# Patient Record
Sex: MALE | Race: WHITE | NOT HISPANIC OR LATINO | Employment: FULL TIME | URBAN - METROPOLITAN AREA
[De-identification: names, ages, dates, MRNs, and addresses within clinical notes are randomized per-mention and may not be internally consistent; named-entity substitution may affect disease eponyms.]

---

## 2019-03-27 ENCOUNTER — OFFICE VISIT - HEALTHEAST (OUTPATIENT)
Dept: FAMILY MEDICINE | Facility: CLINIC | Age: 39
End: 2019-03-27

## 2019-03-27 ENCOUNTER — RECORDS - HEALTHEAST (OUTPATIENT)
Dept: ADMINISTRATIVE | Facility: OTHER | Age: 39
End: 2019-03-27

## 2019-03-27 DIAGNOSIS — R19.8 GI PROBLEM: ICD-10-CM

## 2019-03-27 DIAGNOSIS — J30.1 HAYFEVER: ICD-10-CM

## 2019-03-27 DIAGNOSIS — K58.0 IRRITABLE BOWEL SYNDROME WITH DIARRHEA: ICD-10-CM

## 2019-03-27 DIAGNOSIS — F90.1 ATTENTION DEFICIT HYPERACTIVITY DISORDER (ADHD), PREDOMINANTLY HYPERACTIVE TYPE: ICD-10-CM

## 2019-03-27 DIAGNOSIS — F43.10 PTSD (POST-TRAUMATIC STRESS DISORDER): ICD-10-CM

## 2019-03-27 DIAGNOSIS — Z79.899 MEDICAL MARIJUANA USE: ICD-10-CM

## 2019-05-09 ENCOUNTER — COMMUNICATION - HEALTHEAST (OUTPATIENT)
Dept: SCHEDULING | Facility: CLINIC | Age: 39
End: 2019-05-09

## 2019-05-14 ENCOUNTER — OFFICE VISIT - HEALTHEAST (OUTPATIENT)
Dept: FAMILY MEDICINE | Facility: CLINIC | Age: 39
End: 2019-05-14

## 2019-05-14 DIAGNOSIS — F43.10 PTSD (POST-TRAUMATIC STRESS DISORDER): ICD-10-CM

## 2019-05-14 DIAGNOSIS — K58.0 IRRITABLE BOWEL SYNDROME WITH DIARRHEA: ICD-10-CM

## 2019-06-03 ENCOUNTER — COMMUNICATION - HEALTHEAST (OUTPATIENT)
Dept: FAMILY MEDICINE | Facility: CLINIC | Age: 39
End: 2019-06-03

## 2019-08-13 ENCOUNTER — COMMUNICATION - HEALTHEAST (OUTPATIENT)
Dept: FAMILY MEDICINE | Facility: CLINIC | Age: 39
End: 2019-08-13

## 2019-08-13 DIAGNOSIS — F90.1 ATTENTION DEFICIT HYPERACTIVITY DISORDER (ADHD), PREDOMINANTLY HYPERACTIVE TYPE: ICD-10-CM

## 2019-09-17 ENCOUNTER — COMMUNICATION - HEALTHEAST (OUTPATIENT)
Dept: FAMILY MEDICINE | Facility: CLINIC | Age: 39
End: 2019-09-17

## 2019-09-26 ENCOUNTER — COMMUNICATION - HEALTHEAST (OUTPATIENT)
Dept: FAMILY MEDICINE | Facility: CLINIC | Age: 39
End: 2019-09-26

## 2019-09-26 DIAGNOSIS — F90.1 ATTENTION DEFICIT HYPERACTIVITY DISORDER (ADHD), PREDOMINANTLY HYPERACTIVE TYPE: ICD-10-CM

## 2019-11-07 ENCOUNTER — COMMUNICATION - HEALTHEAST (OUTPATIENT)
Dept: FAMILY MEDICINE | Facility: CLINIC | Age: 39
End: 2019-11-07

## 2019-11-12 ENCOUNTER — COMMUNICATION - HEALTHEAST (OUTPATIENT)
Dept: FAMILY MEDICINE | Facility: CLINIC | Age: 39
End: 2019-11-12

## 2019-11-14 ENCOUNTER — COMMUNICATION - HEALTHEAST (OUTPATIENT)
Dept: FAMILY MEDICINE | Facility: CLINIC | Age: 39
End: 2019-11-14

## 2019-12-27 ENCOUNTER — RECORDS - HEALTHEAST (OUTPATIENT)
Dept: ADMINISTRATIVE | Facility: OTHER | Age: 39
End: 2019-12-27

## 2020-01-16 ENCOUNTER — OFFICE VISIT - HEALTHEAST (OUTPATIENT)
Dept: FAMILY MEDICINE | Facility: CLINIC | Age: 40
End: 2020-01-16

## 2020-01-16 DIAGNOSIS — F90.1 ATTENTION DEFICIT HYPERACTIVITY DISORDER (ADHD), PREDOMINANTLY HYPERACTIVE TYPE: ICD-10-CM

## 2020-01-16 DIAGNOSIS — Z51.81 ENCOUNTER FOR THERAPEUTIC DRUG LEVEL MONITORING: ICD-10-CM

## 2020-01-16 DIAGNOSIS — M51.26 LUMBAR DISC HERNIATION: ICD-10-CM

## 2020-01-16 DIAGNOSIS — M47.812 SPONDYLOSIS OF CERVICAL REGION WITHOUT MYELOPATHY OR RADICULOPATHY: ICD-10-CM

## 2020-01-18 LAB
6MAM UR QL: NOT DETECTED
7AMINOCLONAZEPAM UR QL: NOT DETECTED
A-OH ALPRAZ UR QL: NOT DETECTED
ALPRAZ UR QL: NOT DETECTED
AMPHET UR QL SCN: NOT DETECTED
BARBITURATES UR QL: NOT DETECTED
BUPRENORPHINE UR QL: NOT DETECTED
BZE UR QL: NOT DETECTED
CARBOXYTHC UR QL: PRESENT
CARISOPRODOL UR QL: NOT DETECTED
CLONAZEPAM UR QL: NOT DETECTED
CODEINE UR QL: NOT DETECTED
CREAT UR-MCNC: 121.1 MG/DL (ref 20–400)
DIAZEPAM UR QL: NOT DETECTED
ETHYL GLUCURONIDE UR QL: NOT DETECTED
FENTANYL UR QL: NOT DETECTED
HYDROCODONE UR QL: NOT DETECTED
HYDROMORPHONE UR QL: NOT DETECTED
LORAZEPAM UR QL: NOT DETECTED
MDA UR QL: NOT DETECTED
MDEA UR QL: NOT DETECTED
MDMA UR QL: NOT DETECTED
ME-PHENIDATE UR QL: NOT DETECTED
MEPERIDINE UR QL: NOT DETECTED
METHADONE UR QL: NOT DETECTED
METHAMPHET UR QL: NOT DETECTED
MIDAZOLAM UR QL SCN: NOT DETECTED
MORPHINE UR QL: NOT DETECTED
NORBUPRENORPHINE UR QL CFM: NOT DETECTED
NORDIAZEPAM UR QL: NOT DETECTED
NORFENTANYL UR QL: NOT DETECTED
NORHYDROCODONE UR QL CFM: NOT DETECTED
NOROXYCODONE UR QL CFM: NOT DETECTED
NOROXYMORPHONE UR QL SCN: NOT DETECTED
OXAZEPAM UR QL: NOT DETECTED
OXYCODONE UR QL: NOT DETECTED
OXYMORPHONE UR QL: NOT DETECTED
PATHOLOGY STUDY: NORMAL
PCP UR QL: NOT DETECTED
PHENTERMINE UR QL: NOT DETECTED
PROPOXYPH UR QL: NOT DETECTED
SERVICE CMNT-IMP: NORMAL
TAPENTADOL UR QL SCN: NOT DETECTED
TAPENTADOL UR QL SCN: NOT DETECTED
TEMAZEPAM UR QL: NOT DETECTED
TRAMADOL UR QL: NOT DETECTED
ZOLPIDEM UR QL: NOT DETECTED

## 2020-01-20 ASSESSMENT — ANXIETY QUESTIONNAIRES
7. FEELING AFRAID AS IF SOMETHING AWFUL MIGHT HAPPEN: MORE THAN HALF THE DAYS
5. BEING SO RESTLESS THAT IT IS HARD TO SIT STILL: NEARLY EVERY DAY
6. BECOMING EASILY ANNOYED OR IRRITABLE: MORE THAN HALF THE DAYS
3. WORRYING TOO MUCH ABOUT DIFFERENT THINGS: NEARLY EVERY DAY
1. FEELING NERVOUS, ANXIOUS, OR ON EDGE: NEARLY EVERY DAY
GAD7 TOTAL SCORE: 18
2. NOT BEING ABLE TO STOP OR CONTROL WORRYING: MORE THAN HALF THE DAYS
4. TROUBLE RELAXING: NEARLY EVERY DAY

## 2020-01-20 ASSESSMENT — PATIENT HEALTH QUESTIONNAIRE - PHQ9: SUM OF ALL RESPONSES TO PHQ QUESTIONS 1-9: 22

## 2020-03-12 ENCOUNTER — COMMUNICATION - HEALTHEAST (OUTPATIENT)
Dept: FAMILY MEDICINE | Facility: CLINIC | Age: 40
End: 2020-03-12

## 2020-03-12 DIAGNOSIS — F90.1 ATTENTION DEFICIT HYPERACTIVITY DISORDER (ADHD), PREDOMINANTLY HYPERACTIVE TYPE: ICD-10-CM

## 2020-05-14 ENCOUNTER — COMMUNICATION - HEALTHEAST (OUTPATIENT)
Dept: FAMILY MEDICINE | Facility: CLINIC | Age: 40
End: 2020-05-14

## 2020-05-14 DIAGNOSIS — F90.1 ATTENTION DEFICIT HYPERACTIVITY DISORDER (ADHD), PREDOMINANTLY HYPERACTIVE TYPE: ICD-10-CM

## 2020-05-18 ENCOUNTER — COMMUNICATION - HEALTHEAST (OUTPATIENT)
Dept: FAMILY MEDICINE | Facility: CLINIC | Age: 40
End: 2020-05-18

## 2020-05-18 DIAGNOSIS — F90.1 ATTENTION DEFICIT HYPERACTIVITY DISORDER (ADHD), PREDOMINANTLY HYPERACTIVE TYPE: ICD-10-CM

## 2020-05-20 ENCOUNTER — COMMUNICATION - HEALTHEAST (OUTPATIENT)
Dept: FAMILY MEDICINE | Facility: CLINIC | Age: 40
End: 2020-05-20

## 2020-05-20 DIAGNOSIS — F90.1 ATTENTION DEFICIT HYPERACTIVITY DISORDER (ADHD), PREDOMINANTLY HYPERACTIVE TYPE: ICD-10-CM

## 2020-06-04 ENCOUNTER — COMMUNICATION - HEALTHEAST (OUTPATIENT)
Dept: FAMILY MEDICINE | Facility: CLINIC | Age: 40
End: 2020-06-04

## 2020-06-05 ENCOUNTER — OFFICE VISIT - HEALTHEAST (OUTPATIENT)
Dept: FAMILY MEDICINE | Facility: CLINIC | Age: 40
End: 2020-06-05

## 2020-06-05 ENCOUNTER — COMMUNICATION - HEALTHEAST (OUTPATIENT)
Dept: SCHEDULING | Facility: CLINIC | Age: 40
End: 2020-06-05

## 2020-06-05 DIAGNOSIS — F43.10 PTSD (POST-TRAUMATIC STRESS DISORDER): ICD-10-CM
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
## 2020-07-06 ENCOUNTER — COMMUNICATION - HEALTHEAST (OUTPATIENT)
Dept: FAMILY MEDICINE | Facility: CLINIC | Age: 40
End: 2020-07-06

## 2020-07-06 DIAGNOSIS — F90.1 ATTENTION DEFICIT HYPERACTIVITY DISORDER (ADHD), PREDOMINANTLY HYPERACTIVE TYPE: ICD-10-CM

## 2020-08-19 ENCOUNTER — COMMUNICATION - HEALTHEAST (OUTPATIENT)
Dept: FAMILY MEDICINE | Facility: CLINIC | Age: 40
End: 2020-08-19

## 2020-08-19 DIAGNOSIS — F90.1 ATTENTION DEFICIT HYPERACTIVITY DISORDER (ADHD), PREDOMINANTLY HYPERACTIVE TYPE: ICD-10-CM

## 2020-09-03 ENCOUNTER — COMMUNICATION - HEALTHEAST (OUTPATIENT)
Dept: FAMILY MEDICINE | Facility: CLINIC | Age: 40
End: 2020-09-03

## 2020-09-04 ENCOUNTER — COMMUNICATION - HEALTHEAST (OUTPATIENT)
Dept: FAMILY MEDICINE | Facility: CLINIC | Age: 40
End: 2020-09-04

## 2020-09-23 ENCOUNTER — COMMUNICATION - HEALTHEAST (OUTPATIENT)
Dept: SCHEDULING | Facility: CLINIC | Age: 40
End: 2020-09-23

## 2020-09-23 DIAGNOSIS — F90.1 ATTENTION DEFICIT HYPERACTIVITY DISORDER (ADHD), PREDOMINANTLY HYPERACTIVE TYPE: ICD-10-CM

## 2020-10-02 ENCOUNTER — OFFICE VISIT - HEALTHEAST (OUTPATIENT)
Dept: FAMILY MEDICINE | Facility: CLINIC | Age: 40
End: 2020-10-02

## 2020-10-02 ENCOUNTER — COMMUNICATION - HEALTHEAST (OUTPATIENT)
Dept: FAMILY MEDICINE | Facility: CLINIC | Age: 40
End: 2020-10-02

## 2020-10-02 DIAGNOSIS — F43.10 PTSD (POST-TRAUMATIC STRESS DISORDER): ICD-10-CM

## 2020-10-02 DIAGNOSIS — Z79.899 MEDICAL MARIJUANA USE: ICD-10-CM

## 2020-10-02 ASSESSMENT — PATIENT HEALTH QUESTIONNAIRE - PHQ9: SUM OF ALL RESPONSES TO PHQ QUESTIONS 1-9: 8
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
## 2020-10-14 ENCOUNTER — COMMUNICATION - HEALTHEAST (OUTPATIENT)
Dept: FAMILY MEDICINE | Facility: CLINIC | Age: 40
End: 2020-10-14

## 2020-10-27 ENCOUNTER — COMMUNICATION - HEALTHEAST (OUTPATIENT)
Dept: SCHEDULING | Facility: CLINIC | Age: 40
End: 2020-10-27

## 2020-12-21 ENCOUNTER — COMMUNICATION - HEALTHEAST (OUTPATIENT)
Dept: FAMILY MEDICINE | Facility: CLINIC | Age: 40
End: 2020-12-21

## 2020-12-21 DIAGNOSIS — F90.1 ATTENTION DEFICIT HYPERACTIVITY DISORDER (ADHD), PREDOMINANTLY HYPERACTIVE TYPE: ICD-10-CM

## 2021-02-05 ENCOUNTER — COMMUNICATION - HEALTHEAST (OUTPATIENT)
Dept: FAMILY MEDICINE | Facility: CLINIC | Age: 41
End: 2021-02-05

## 2021-02-05 DIAGNOSIS — F90.1 ATTENTION DEFICIT HYPERACTIVITY DISORDER (ADHD), PREDOMINANTLY HYPERACTIVE TYPE: ICD-10-CM

## 2021-02-08 ENCOUNTER — OFFICE VISIT - HEALTHEAST (OUTPATIENT)
Dept: FAMILY MEDICINE | Facility: CLINIC | Age: 41
End: 2021-02-08

## 2021-02-08 DIAGNOSIS — F43.10 PTSD (POST-TRAUMATIC STRESS DISORDER): ICD-10-CM

## 2021-02-08 DIAGNOSIS — Z79.899 OTHER LONG TERM (CURRENT) DRUG THERAPY: ICD-10-CM

## 2021-02-08 DIAGNOSIS — F32.0 MILD MAJOR DEPRESSION (H): ICD-10-CM

## 2021-02-08 DIAGNOSIS — F90.1 ATTENTION DEFICIT HYPERACTIVITY DISORDER (ADHD), PREDOMINANTLY HYPERACTIVE TYPE: ICD-10-CM

## 2021-03-09 ENCOUNTER — COMMUNICATION - HEALTHEAST (OUTPATIENT)
Dept: FAMILY MEDICINE | Facility: CLINIC | Age: 41
End: 2021-03-09

## 2021-03-09 DIAGNOSIS — F90.1 ATTENTION DEFICIT HYPERACTIVITY DISORDER (ADHD), PREDOMINANTLY HYPERACTIVE TYPE: ICD-10-CM

## 2021-04-06 ENCOUNTER — COMMUNICATION - HEALTHEAST (OUTPATIENT)
Dept: FAMILY MEDICINE | Facility: CLINIC | Age: 41
End: 2021-04-06

## 2021-04-07 ENCOUNTER — COMMUNICATION - HEALTHEAST (OUTPATIENT)
Dept: FAMILY MEDICINE | Facility: CLINIC | Age: 41
End: 2021-04-07

## 2021-04-07 DIAGNOSIS — F90.1 ATTENTION DEFICIT HYPERACTIVITY DISORDER (ADHD), PREDOMINANTLY HYPERACTIVE TYPE: ICD-10-CM

## 2021-04-08 ENCOUNTER — COMMUNICATION - HEALTHEAST (OUTPATIENT)
Dept: FAMILY MEDICINE | Facility: CLINIC | Age: 41
End: 2021-04-08

## 2021-04-08 DIAGNOSIS — F90.1 ATTENTION DEFICIT HYPERACTIVITY DISORDER (ADHD), PREDOMINANTLY HYPERACTIVE TYPE: ICD-10-CM

## 2021-05-11 ENCOUNTER — COMMUNICATION - HEALTHEAST (OUTPATIENT)
Dept: FAMILY MEDICINE | Facility: CLINIC | Age: 41
End: 2021-05-11

## 2021-05-11 DIAGNOSIS — F90.1 ATTENTION DEFICIT HYPERACTIVITY DISORDER (ADHD), PREDOMINANTLY HYPERACTIVE TYPE: ICD-10-CM

## 2021-05-17 RX ORDER — DEXTROAMPHETAMINE SACCHARATE, AMPHETAMINE ASPARTATE, DEXTROAMPHETAMINE SULFATE AND AMPHETAMINE SULFATE 2.5; 2.5; 2.5; 2.5 MG/1; MG/1; MG/1; MG/1
10 TABLET ORAL DAILY
Qty: 30 TABLET | Refills: 0 | Status: SHIPPED | OUTPATIENT
Start: 2021-06-09 | End: 2021-12-07

## 2021-05-17 RX ORDER — DEXTROAMPHETAMINE SACCHARATE, AMPHETAMINE ASPARTATE MONOHYDRATE, DEXTROAMPHETAMINE SULFATE AND AMPHETAMINE SULFATE 5; 5; 5; 5 MG/1; MG/1; MG/1; MG/1
20 CAPSULE, EXTENDED RELEASE ORAL DAILY
Qty: 31 CAPSULE | Refills: 0 | Status: SHIPPED | OUTPATIENT
Start: 2021-05-17 | End: 2021-12-07

## 2021-05-17 RX ORDER — DEXTROAMPHETAMINE SACCHARATE, AMPHETAMINE ASPARTATE MONOHYDRATE, DEXTROAMPHETAMINE SULFATE AND AMPHETAMINE SULFATE 5; 5; 5; 5 MG/1; MG/1; MG/1; MG/1
20 CAPSULE, EXTENDED RELEASE ORAL DAILY
Qty: 30 CAPSULE | Refills: 0 | Status: SHIPPED | OUTPATIENT
Start: 2021-06-17 | End: 2021-12-07

## 2021-05-26 ASSESSMENT — PATIENT HEALTH QUESTIONNAIRE - PHQ9: SUM OF ALL RESPONSES TO PHQ QUESTIONS 1-9: 22

## 2021-05-27 ASSESSMENT — PATIENT HEALTH QUESTIONNAIRE - PHQ9: SUM OF ALL RESPONSES TO PHQ QUESTIONS 1-9: 8

## 2021-05-27 NOTE — PATIENT INSTRUCTIONS - HE
Make an appointment to see Dr esposito for Medical Marijuana certification  Make an appointment to see A psychistrist

## 2021-05-27 NOTE — PROGRESS NOTES
OFFICE VISIT - FAMILY MEDICINE     ASSESSMENT AND PLAN     1. Attention deficit hyperactivity disorder (ADHD), predominantly hyperactive type  dextroamphetamine-amphetamine (ADDERALL) 10 mg Tab tablet    dextroamphetamine-amphetamine (ADDERALL XR) 20 MG 24 hr capsule   2. PTSD (post-traumatic stress disorder)  Ambulatory referral to Psychiatry   3. Medical marijuana use  Ambulatory referral to Psychiatry   4. Irritable bowel syndrome with diarrhea     5. Hayfever     6. GI problem     ADHD, previously managed by this clinic but has not been seen for about 4-year because he moved to [place], he is back in the [place] area, and would like his medication to be refilled, I did look at the [place] prescription drug monitoring,he is pretty consistent with refill, we did review possible side effect of medications especially when combined with marijuana,  Urged him to sign for medical marijuana program, he can make an appointment with Dr. Salazar to help with certification based on his PTSD.  Also told him to establish care with a psychiatrist.  He will follow-up in a 3-month timeframe, if there is any new concerns.  Irritable bowel syndrome has improved with avoidance of triggering foods.    CHIEF COMPLAINT   Establish Care and Medication Refill (Adderall)    HPI   Luis Enrique Rosario is a 39 y.o. male.  No Patient Care Coordination Note on file.  Last office visit at this clinic was in 2015, I have seen him October 10, 2014, at that time was suffering with irritable bowel syndrome, patient is returning as a new patient stating that he did move with a girlfriend to leaving [place], but he did not works well for him.  He is backing in the [facility].  He is still on Adderall X are 20 mg in the morning, and regular Adderall 10 mg in the afternoon, he stated that he does control his ADHD symptoms, is able to focus, able to concentrate, less distracted..  He also has a  dog to help with his PTSD, he also  has a [place] marijuana card and according to him in [place] he can get a marijuana certificate for medical or recreational.  IBS symptoms has been controlled probiotic, avoidance of triggering food like like dairy, gluten etc.  He denies any other drugs use.    Review of Systems As per HPI, otherwise negative.    OBJECTIVE   /75 (Patient Site: Right Arm, Patient Position: Sitting, Cuff Size: Adult Regular)   Pulse 66   Wt 162 lb 8 oz (73.7 kg)   SpO2 98%   BMI 23.52 kg/m    Physical Exam   Constitutional: He is oriented to person, place, and time. He appears well-developed and well-nourished.   HENT:   Head: Normocephalic and atraumatic.   Neck: Normal range of motion. Neck supple. No JVD present. No tracheal deviation present. No thyromegaly present.   Cardiovascular: Normal rate, regular rhythm, normal heart sounds and intact distal pulses. Exam reveals no gallop and no friction rub.   No murmur heard.  Pulmonary/Chest: Effort normal and breath sounds normal. No respiratory distress. He has no wheezes. He has no rales.   Musculoskeletal: He exhibits no edema or tenderness.   Lymphadenopathy:     He has no cervical adenopathy.   Neurological: He is alert and oriented to person, place, and time. Coordination normal.   Psychiatric: He has a normal mood and affect. Judgment and thought content normal.       PFSH     Family History   Problem Relation Age of Onset     Depression Mother      Depression Brother      Arthritis Maternal Grandmother      Diabetes Maternal Grandmother      Heart disease Maternal Grandmother      Hypertension Maternal Grandmother      Vision loss Maternal Grandmother      Alcohol abuse Maternal Grandfather      Heart disease Maternal Grandfather      Hypertension Maternal Grandfather      Alcohol abuse Paternal Grandmother      Arthritis Paternal Grandmother      Alcohol abuse Paternal Grandfather      Social History     Socioeconomic History     Marital status: Single     Spouse  name: Not on file     Number of children: Not on file     Years of education: Not on file     Highest education level: Not on file   Occupational History     Not on file   Social Needs     Financial resource strain: Not on file     Food insecurity:     Worry: Not on file     Inability: Not on file     Transportation needs:     Medical: Not on file     Non-medical: Not on file   Tobacco Use     Smoking status: Never Smoker     Smokeless tobacco: Never Used   Substance and Sexual Activity     Alcohol use: No     Frequency: Never     Drug use: No     Sexual activity: Not on file   Lifestyle     Physical activity:     Days per week: Not on file     Minutes per session: Not on file     Stress: Not on file   Relationships     Social connections:     Talks on phone: Not on file     Gets together: Not on file     Attends Caodaism service: Not on file     Active member of club or organization: Not on file     Attends meetings of clubs or organizations: Not on file     Relationship status: Not on file     Intimate partner violence:     Fear of current or ex partner: Not on file     Emotionally abused: Not on file     Physically abused: Not on file     Forced sexual activity: Not on file   Other Topics Concern     Not on file   Social History Narrative     Not on file     Relevant history was reviewed with the patient today, unless noted in HPI, nothing is pertinent for this visit.  [facility]     Patient Active Problem List    Diagnosis Date Noted     Hayfever      GI problem      PTSD (post-traumatic stress disorder) 12/22/2016     Medical marijuana use      Overview Note:     Created by Conversion    Replacement Utility updated for latest IMO load       Sore Throat      Overview Note:     Created by Conversion         Attention Deficit Disorder Without Hyperactivity      Overview Note:     Created by Conversion    Replacement Utility updated for latest IMO load       Anxiety      Overview Note:     Created by  Conversion    Replacement Utility updated for latest IMO load       Panic Disorder Without Agoraphobia      Overview Note:     Created by Conversion         Allergies      Overview Note:     Created by Conversion         Abdominal Pain      Overview Note:     Created by Conversion    Replacement Utility updated for latest IMO load       Allergic Rhinitis      Overview Note:     Created by Conversion    Replacement Utility updated for latest IMO load       Irritable Bowel Syndrome      Overview Note:     Created by Conversion         Allergy To Nuts      Overview Note:     Created by Conversion         Lipoma Of The Adipose Tissue      Overview Note:     Created by Conversion    Replacement Utility updated for latest IMO load       Foot Pain (Soft Tissue)      Overview Note:     Created by Conversion         No past surgical history on file.    RESULTS/CONSULTS (Lab/Rad)   No results found for this or any previous visit (from the past 168 hour(s)).  No results found.  MEDICATIONS     Current Outpatient Medications on File Prior to Visit   Medication Sig Dispense Refill     LACTOBACILLUS ACIDOPHILUS (PROBIOTIC ORAL)        loratadine-pseudoephedrine (CLARITIN-D 24-HOUR)  mg per 24 hr tablet Take 1 tablet by mouth daily. As directed.       EPINEPHrine (EPIPEN 2-ION) 0.3 mg/0.3 mL (1:1,000) atIn Inject 0.3 mg as directed. As directed.       MULTIVITAMIN ORAL        No current facility-administered medications on file prior to visit.        HEALTH MAINTENANCE / SCREENING   PHQ-2 Total Score: 3 (3/27/2019  1:38 PM)  , PHQ-9 Total Score: 15 (3/27/2019  1:38 PM)  ,VÍCTOR 7 Total Score: 10 (3/27/2019  2:00 PM)    Immunization History   Administered Date(s) Administered     DTaP, historic 11/15/1982, 08/17/1992     IPV 11/15/1982, 08/17/1992     MMR 06/15/1981, 08/17/1992     Health Maintenance   Topic     TD 18+ HE      TDAP ADULT ONE TIME DOSE      INFLUENZA VACCINE RULE BASED (1)     ADVANCE DIRECTIVES DISCUSSED WITH  PATIENT      COLONOSCOPY        Nya Mata MD  Family Medicine, [facility]     This note was dictated using a voice recognition software.  Any grammatical or context distortion are unintentional and inherent to the software.

## 2021-05-28 ASSESSMENT — ANXIETY QUESTIONNAIRES: GAD7 TOTAL SCORE: 18

## 2021-05-28 NOTE — TELEPHONE ENCOUNTER
Reason contacted:  Pt  Information relayed:  Informed pt that he will need to go through the enrollemtn process.  Made an appt with Dr. Salazar on 5/14/19 at 220 pm-okay to use reserve slot per Dr. Salazar.    Additional questions:  No  Further follow-up needed:  No  Okay to leave a detailed message:  No

## 2021-05-28 NOTE — PATIENT INSTRUCTIONS - HE
THe Microbiome Solution Lindy Brower     [facility]    Consider Consult with Dr [facility]       You are certified and should have an email from the County for visit soon    Supporting barrier function:    Try to identify and eliminate foods known to cause increased intestinal permeability:  Examples include gluten, dairy/lactose, capsaicin and spicy foods, FODMAP foods    Consider testing for food allergens and avoid any foods that are known to release mast cells or make you flush    As a rule try to avoid nonsteroidal anti-inflammatory agents if possible such as ibuprofen, naproxen, regular aspirin    Avoid strenuous physical activity/exercise or pay attention to supporting your intestinal and immune health before and after such activities.  However for the most part moderate physical activity or exercise is helpful.    Avoid all processed foods with artificial colors or flavors.    Eat abundant amounts of fresh fruits and vegetables to maximize the amount and diversity of phytonutrients.    Consider the following nutrients for Supplements:    Place to Purchase  [employer], [employer], Somewhere, Vitamin Shoppe    Omega-3 fatty acids, ALA, EPA, DHA diet or supplementation  Such as fish, nuts, olive oil, Fabrizio seeds, flax seeds, hemp seeds (ensure that seeds are ground or milled for maximum benefit)    Glutamine 4- 8 g daily    Vitamin D 2000 international units minimum daily it is best to test and adjust to achieve a 50-60 level    Probiotics (mixed drain combination 20-40 billion CFU, consider high dose for long-standing intestinal barrier issues especially those associated with inflammatory bowel disease) OTC Florajen or PathAR Therbiotic Complete    Prebiotics  (these are precursors for important short-chain fatty acids-asparagus, onion, leaks, Chicori root)    Zinc 25 mg daily along with copper 1 mg daily    Iron ( especially when iron deficiency is  confirmed)    Flavonoids (Flavonoids[edit]  red, blue, purple pigments    Flavonols    Quercetin red and yellow onions, tea, wine, apples, cranberries, buckwheat, beans, lovage.    Kaempferol tea, strawberries, gooseberries, cranberries, grapefruit, apples, peas, brassicates (broccoli, kale, brussels sprouts, cabbage), chives, spinach, endive, vivek, tomatoes.    Myricetin grapes, red wine, berries, walnuts.    Fisetin strawberries, cucumbers.    Rutin citrus fruits, oranges, maritza, limes, grapefruit, berries, peaches, apples, pagoda tree fruits, asparagus, buckwheat, parsley, tomatoes, apricots, rhubarb, tea.    Isorhamnetin red turnip, goldenrod, mustard leaf, ginkgo biloba.    Flavanones    Hesperidin citrus fruits.    Naringenin citrus fruits.    Silybin milk thistle.    Eriodictyol    Flavones    Acacetin Robinia pseudoacacia, Turnera diffusa.    Apigenin chamomile, celery, parsley.    Chrysin Passiflora caerulea, Pleurotus ostreatus, Oroxylum indicum.    Diosmetin Vicia.    Tangeritin tangerine and other citrus peels.    Luteolin beets, artichokes, celery, carrots, celeriac, rutabaga, parsley, mint, chamomile, lemongrass, chrysanthemum.    Flavan-3-ols (flavanols)    Catechins white tea, green tea, black tea, grapes, wine, apple juice, cocoa, lentils, black-eyed peas.    (+)-Catechin    (+)-Gallocatechin    (?)-Epicatechin    (?)-Epigallocatechin    (?)-Epigallocatechin gallate (EGCG) green tea.    (?)-Epicatechin 3-gallate    Theaflavin black tea.    Theaflavin-3-gallate black tea.    Thearubigins    Proanthocyanidins    Flavanonols    Anthocyanidins (flavonals) and Anthocyanins red wine, many red, purple or blue fruits and vegetables.    Pelargonidin bilberry, raspberry, strawberry.    Peonidin bilberry, blueberry, cherry, cranberry, peach.    Cyanidin red apple & pear, bilberry, blackberry, blueberry, cherry, cranberry, peach, plum, hawthorn, loganberry,  cocoa.    Delphinidin bilberry, blueberry, eggplant.    Malvidin malve, bilberry, blueberry.    Petunidin  Isoflavonoids[edit]    Isoflavones (phytoestrogens) use the 3-phenylchromen-4-one skeleton (with no hydroxyl group substitution on carbon at position 2).    Daidzein (formononetin) soy, alfalfa sprouts, red clover, chickpeas, peanuts, kudzu, other legumes.    Genistein (biochanin A) soy, alfalfa sprouts, red clover, chickpeas, peanuts, other legumes.    Glycitein soy.    Isoflavanes    Isoflavandiols    Isoflavenes    Pterocarpans or Coumestans (phytoestrogens)  Coumestrol red clover, alfalfa sprouts, soy, peas, brussels sprouts.      Colostrum/lactoferrin/IgG consider using a combination [usually a concentrated colostrum product contains 15-40% IgG and some level of lactoferrin]    Berberine 500 mg twice daily

## 2021-05-28 NOTE — TELEPHONE ENCOUNTER
As we discussed with the patient last visit, in [place] he needs to go to through the certification  process, please assist him make an appointment with Dr. Salazar to go through certification.

## 2021-05-28 NOTE — TELEPHONE ENCOUNTER
"Pt reports he has been a [place] marijuana pt for the past three years. Chronic pain, \"terrible pains in stomach\" which per pt is affecting his bi polar illness and affecting his work. Pt states \"I need to be able to access medical marijuana legally\". Per pt referred to psychiatry at [facility]. [facility] left pt a voicemail telling pt he did not need to do an intake for medical marijuana and he has not been able to reach anyone there to clarify. He has not heard back from them. Pt reports he is \"completely out of his medical marijuana and that is the only thing that his helping him\". Pt requests this be dealt with urgently.     Advised pt message would be routed to PCP to advise.     Pt verbalizes understanding and agrees to plan. Ok to leave a detailed message.    Reason for Disposition    [1] Request for URGENT new prescription or refill of \"essential\" medication (i.e., likelihood of harm to patient if not taken) AND [2] triager unable to fill per unit policy    Protocols used: MEDICATION QUESTION CALL-A-AH      "
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
## 2021-05-29 NOTE — TELEPHONE ENCOUNTER
Pt was informed that we had the wrong email address entered.  This has been correct and the pt should be getting notice soon.

## 2021-05-29 NOTE — TELEPHONE ENCOUNTER
Question following Office Visit  When did you see your provider: 5/14/19  What is your question: Patient has not heard anything from the Medical Marijuana Program.  What is happening?  Please call the patient.    Okay to leave a detailed message: Yes.  Patient is not feeling today and maybe sleeping when this call is returned.

## 2021-05-31 NOTE — TELEPHONE ENCOUNTER
Dr. Mata,    Patient came in today but was down for tomorrow and he was requesting to get his medications refilled but could not make it back down tomorrow for his appointment.    HE was hoping to get a refill and I told him he would have to have an appointment on the schedule.    Thanks,  Shari

## 2021-05-31 NOTE — TELEPHONE ENCOUNTER
Pt's informed and will call back for appointment.     Informed pt that you are not in the office today and will review message when you return. He wants his adderall to be sent to [facility] in [facility]. I did called [facility] on [address] and cancel the prescription there. Please sign new pended medication order to [facility] in [place] for pt. Thanks!

## 2021-06-01 NOTE — TELEPHONE ENCOUNTER
Who is calling:  Patient  Reason for Call:  Patient stated he still has not heard anything on his MyChart or e-mail from anyone on his certification for medical cannabis. Patient stated he called about this in June and Dr. Salazar's office had provided the wrong e-mail address. Patient is afraid that now the process has to be started. Please reach out to patient on what he is supposed to do next. See the encounter on 6/3/19.  Date of last appointment with primary care: 5/14/19  Okay to leave a detailed message: Yes  [phone]

## 2021-06-01 NOTE — TELEPHONE ENCOUNTER
Who is calling:  Patient   Reason for Call:  Patient is requesting to send below prescription to his home address . Address is on file .  Date of last appointment with primary care: 5/14/19  Okay to leave a detailed message: No

## 2021-06-01 NOTE — TELEPHONE ENCOUNTER
Controlled Substance Refill Request  Medication Name:   Requested Prescriptions     Pending Prescriptions Disp Refills     dextroamphetamine-amphetamine (ADDERALL XR) 20 MG 24 hr capsule 30 capsule 0     Sig: Take 1 capsule (20 mg total) by mouth daily.     dextroamphetamine-amphetamine (ADDERALL) 10 mg Tab tablet 30 tablet 0     Sig: Take 1 tablet by mouth daily.     Date Last Fill: 8/16/19  Pharmacy: [id number]      Submit electronically to pharmacy  Controlled Substance Agreement Date Scanned:   Encounter-Level CSA Scan Date:    There are no encounter-level csa scan date.       Last office visit with prescriber/PCP: 3/27/2019 Nya Mata MD OR same dept: 5/14/2019 Hua Salazar MD OR same specialty: 5/14/2019 Hua Salazar MD  Last physical: Visit date not found Last MTM visit: Visit date not found      Please expedite as patient has 2 days left of above medications.  Please reach out to patient if he needs to  paper copies of above medications.

## 2021-06-02 VITALS — WEIGHT: 162.5 LBS | BODY MASS INDEX: 23.52 KG/M2

## 2021-06-03 VITALS — BODY MASS INDEX: 23.16 KG/M2 | WEIGHT: 160 LBS

## 2021-06-03 NOTE — TELEPHONE ENCOUNTER
Patient Returning Call  Reason for call:  email verification  Information relayed to patient:  The patient verified the email address and it is correct at [email or web address]. He checked his spam and everything but has no email from them.   Patient has additional questions:  No  If YES, what are your questions/concerns:  NA  Okay to leave a detailed message?: No

## 2021-06-03 NOTE — TELEPHONE ENCOUNTER
Who is calling:  Patient  Reason for Call:  Patient stated he still has not heard anything on his MyChart or e-mail from anyone on his certification for medical cannabis. Patient stated he called about this in June and Dr. Salazar's office had provided the wrong e-mail address. Patient is afraid that now the process has to be started. Please reach out to patient on what he is supposed to do next. See the encounter on 6/3/19. Patient reports he needs an email from Dr. Salazar stating this was done so they will talk to him.  Date of last appointment with primary care: 5/14/19  Okay to leave a detailed message: Yes  [phone]

## 2021-06-03 NOTE — TELEPHONE ENCOUNTER
Personal Information  Patient [id number]  Email [email or web address]  Prefix  First NameJonathon  Middle Initial  Last NameHamlin  Suffix  Date of [id number]  Gender  Race/Ethnicity  Identify as ?  Phone [phone]      Verify correct email  He is certified    Jhonathan

## 2021-06-04 VITALS
SYSTOLIC BLOOD PRESSURE: 119 MMHG | OXYGEN SATURATION: 97 % | DIASTOLIC BLOOD PRESSURE: 80 MMHG | WEIGHT: 181 LBS | BODY MASS INDEX: 26.19 KG/M2 | HEART RATE: 70 BPM

## 2021-06-05 NOTE — PROGRESS NOTES
OFFICE VISIT - FAMILY MEDICINE     ASSESSMENT AND PLAN     1. Spondylosis of cervical region without myelopathy or radiculopathy     2. Attention deficit hyperactivity disorder (ADHD), predominantly hyperactive type  Pain Management Drug Panel, Urine    dextroamphetamine-amphetamine (ADDERALL) 10 mg Tab tablet    dextroamphetamine-amphetamine (ADDERALL XR) 20 MG 24 hr capsule    DISCONTINUED: dextroamphetamine-amphetamine (ADDERALL XR) 20 MG 24 hr capsule    DISCONTINUED: dextroamphetamine-amphetamine (ADDERALL) 10 mg Tab tablet   3. Lumbar disc herniation     4. Encounter for therapeutic drug level monitoring   Pain Management Drug Panel, Urine   Degenerative joint disease with disc hernia cervical and lumbar spine, we did discuss pros and cons of steroid injection and surgery, we discussed that with approach, does not seem to be responding well to physical therapy on medication therapy, next I will be probably a steroid injection before looking at surgery.  Risk and benefit discussed today.  He does use medical cannabis for posttraumatic stress disorder and for chronic back pain with some mild improvement. He will follow-up with Dr. Salazar for renewal of his certification.  ADHD, appears stable, medication does help him focus, he does take care of the apartment complex, is able to concentrate and accomplish his daily tasks.  Was on cons of medication with side effect discussed.  CHIEF COMPLAINT   Follow-up (medication; would like to start taking adderall.) and Pain (cervical neck pain, has shooting pain down arms and back. Imaging done @[facility] on 12/27/19 MRI cervical spine, would like to discuss treatment options w/Dr. Mata)    NERY   Luis Enrique Rosario is a 39 y.o. male.  No Patient Care Coordination Note on file.    For the past few months, has been experiencing neck and lower back pain, was seen by his neck specialist, MRI cervical and lumbar spine did show degenerative joint disease with disc hernia, specialist  is suggesting steroid injection, he would like to get our opinion before proceeding.  Pain is described as throbbing, moderate about 6-7 out of 10 on a scale 0-10, usually exacerbated with activity, minimally relieved with rest and medical  cannabis that he is currently using.  Has been prescribed prednisone but did not seem to help and it was making him gaining weight.  ADHD, currently taking Adderall XR 20 mg daily in the morning, and 10 mg in the afternoon as needed, he does work as a caretaker for an apartment complex, medication does help him focus at work.  He would like a refill.  He does use medical cannabis for posttraumatic stress disorder and for ongoing cervical and lumbar spine with some mild improvement of his symptoms.      Review of Systems As per HPI, otherwise negative.    OBJECTIVE   /80 (Patient Site: Left Arm, Patient Position: Sitting, Cuff Size: Adult Regular)   Pulse 70   Wt 181 lb (82.1 kg) Comment: shoes on  SpO2 97%   BMI 26.19 kg/m    Physical Exam   Constitutional: He is oriented to person, place, and time. He appears well-developed and well-nourished.   HENT:   Head: Normocephalic and atraumatic.   Neck: Normal range of motion. Neck supple. No JVD present. No tracheal deviation present. No thyromegaly present.   Cardiovascular: Normal rate, regular rhythm, normal heart sounds and intact distal pulses. Exam reveals no gallop and no friction rub.   No murmur heard.  Pulmonary/Chest: Effort normal and breath sounds normal. No respiratory distress. He has no wheezes. He has no rales.   Musculoskeletal:         General: No tenderness or edema.   Lymphadenopathy:     He has no cervical adenopathy.   Neurological: He is alert and oriented to person, place, and time. Coordination normal.   Psychiatric: He has a normal mood and affect. Judgment and thought content normal.       PFSH     Family History   Problem Relation Age of Onset     Depression Mother      Depression Brother       Arthritis Maternal Grandmother      Diabetes Maternal Grandmother      Heart disease Maternal Grandmother      Hypertension Maternal Grandmother      Vision loss Maternal Grandmother      Alcohol abuse Maternal Grandfather      Heart disease Maternal Grandfather      Hypertension Maternal Grandfather      Alcohol abuse Paternal Grandmother      Arthritis Paternal Grandmother      Alcohol abuse Paternal Grandfather      Social History     Socioeconomic History     Marital status: Single     Spouse name: Not on file     Number of children: Not on file     Years of education: Not on file     Highest education level: Not on file   Occupational History     Not on file   Social Needs     Financial resource strain: Not on file     Food insecurity:     Worry: Not on file     Inability: Not on file     Transportation needs:     Medical: Not on file     Non-medical: Not on file   Tobacco Use     Smoking status: Never Smoker     Smokeless tobacco: Never Used   Substance and Sexual Activity     Alcohol use: No     Frequency: Never     Drug use: No     Sexual activity: Not on file   Lifestyle     Physical activity:     Days per week: Not on file     Minutes per session: Not on file     Stress: Not on file   Relationships     Social connections:     Talks on phone: Not on file     Gets together: Not on file     Attends Confucianism service: Not on file     Active member of club or organization: Not on file     Attends meetings of clubs or organizations: Not on file     Relationship status: Not on file     Intimate partner violence:     Fear of current or ex partner: Not on file     Emotionally abused: Not on file     Physically abused: Not on file     Forced sexual activity: Not on file   Other Topics Concern     Not on file   Social History Narrative     Not on file     Relevant history was reviewed with the patient today, unless noted in HPI, nothing is pertinent for this visit.  [facility]     Patient Active Problem List    Diagnosis  Date Noted     Spondylosis of cervical region without myelopathy or radiculopathy 01/16/2020     Lumbar disc herniation 01/16/2020     Hayfever      GI problem      PTSD (post-traumatic stress disorder) 12/22/2016     Medical marijuana use      Overview Note:     Created by Conversion    Replacement Utility updated for latest IMO load       Sore Throat      Overview Note:     Created by Conversion         Attention Deficit Disorder Without Hyperactivity      Overview Note:     Created by Conversion    Replacement Utility updated for latest IMO load       Anxiety      Overview Note:     Created by Conversion    Replacement Utility updated for latest IMO load       Panic Disorder Without Agoraphobia      Overview Note:     Created by Conversion         Allergies      Overview Note:     Created by Conversion         Abdominal Pain      Overview Note:     Created by Conversion    Replacement Utility updated for latest IMO load       Allergic Rhinitis      Overview Note:     Created by Conversion    Replacement Utility updated for latest IMO load       Irritable Bowel Syndrome      Overview Note:     Created by Conversion         Allergy To Nuts      Overview Note:     Created by Conversion         Lipoma Of The Adipose Tissue      Overview Note:     Created by Conversion    Replacement Utility updated for latest IMO load       Foot Pain (Soft Tissue)      Overview Note:     Created by Conversion         No past surgical history on file.    RESULTS/CONSULTS (Lab/Rad)   No results found for this or any previous visit (from the past 168 hour(s)).  No results found.  MEDICATIONS     Current Outpatient Medications on File Prior to Visit   Medication Sig Dispense Refill     loratadine-pseudoephedrine (CLARITIN-D 24-HOUR)  mg per 24 hr tablet Take 1 tablet by mouth daily. As directed.       medical cannabis (Patient's own supply) by Other route see administration instructions. (The purpose of this order is to document  that the patient reports taking medical cannabis. This is not a prescription, and is not used to certify that the patient has a  qualifying medical condition.)       EPINEPHrine (EPIPEN 2-ION) 0.3 mg/0.3 mL (1:1,000) atIn Inject 0.3 mg as directed. As directed.       LACTOBACILLUS ACIDOPHILUS (PROBIOTIC ORAL)        MULTIVITAMIN ORAL        [DISCONTINUED] dextroamphetamine-amphetamine (ADDERALL XR) 20 MG 24 hr capsule Take 1 capsule (20 mg total) by mouth daily. 30 capsule 0     [DISCONTINUED] dextroamphetamine-amphetamine (ADDERALL) 10 mg Tab tablet Take 1 tablet by mouth daily. 30 tablet 0     No current facility-administered medications on file prior to visit.        HEALTH MAINTENANCE / SCREENING   PHQ-2 Total Score: 3 (3/27/2019  1:38 PM)  , PHQ-9 Total Score: 15 (3/27/2019  1:38 PM)  ,VÍCTOR 7 Total Score: 10 (3/27/2019  2:00 PM)    Immunization History   Administered Date(s) Administered     DTaP, historic 11/15/1982, 08/17/1992     IPV 11/15/1982, 08/17/1992     MMR 06/15/1981, 08/17/1992     Health Maintenance   Topic     HIV SCREENING      MEDICARE ANNUAL WELLNESS VISIT      TD 18+ HE      TDAP ADULT ONE TIME DOSE      LIPID      ADVANCE CARE PLANNING      COLONOSCOPY      DEPRESSION ACTION PLAN      INFLUENZA VACCINE RULE BASED        Nya Mata MD  Family Medicine, [facility]     This note was dictated using a voice recognition software.  Any grammatical or context distortion are unintentional and inherent to the software.

## 2021-06-06 NOTE — TELEPHONE ENCOUNTER
Controlled Substance Refill Request  Medication Name:   Requested Prescriptions     Pending Prescriptions Disp Refills     dextroamphetamine-amphetamine (ADDERALL XR) 20 MG 24 hr capsule 30 capsule 0     Sig: Take 1 capsule (20 mg total) by mouth daily.     dextroamphetamine-amphetamine (ADDERALL) 10 mg Tab tablet 30 tablet 0     Sig: Take 1 tablet by mouth daily.     Date Last Fill: 1/16/19  Is patient out of medication?:  Yes  Patient notified refills processed within 3 business days:  Yes  [facility]  Submit electronically to pharmacy  Controlled Substance Agreement on file:   Encounter-Level CSA Scan Date:    There are no encounter-level csa scan date.        Last office visit:  1/16/2020

## 2021-06-08 NOTE — TELEPHONE ENCOUNTER
"Who is calling:  The patient  Reason for Call:  The patient states he needs to be \"recertified \" for the medical marijuana program. The patient was not financially able to pay the fee to [facility] a medical dispensary in [facility] which the patient is able to pay now.   Date of last appointment with primary care: 1/16/20  Okay to leave a detailed message: Yes      "

## 2021-06-08 NOTE — TELEPHONE ENCOUNTER
Left message advising patient to please call and schedule a telephone visit with Dr. Salazar.    KLP, CMA
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
## 2021-06-08 NOTE — TELEPHONE ENCOUNTER
Patient Returning Call  Reason for call:  Patient returning call to check on the status of this request.  Information relayed to patient:  Pending providers review and approval.  Patient has additional questions:  Yes  If YES, what are your questions/concerns:  Can you let Nya Mata MD know this is an urgent matter and this medication also effects my bipolar depression as well.  Okay to leave a detailed message?: Yes

## 2021-06-08 NOTE — TELEPHONE ENCOUNTER
Controlled Substance Refill Request  Medication Name:   Requested Prescriptions     Pending Prescriptions Disp Refills     dextroamphetamine-amphetamine (ADDERALL XR) 20 MG 24 hr capsule 30 capsule 0     Sig: Take 1 capsule (20 mg total) by mouth daily.     dextroamphetamine-amphetamine (ADDERALL) 10 mg Tab tablet 30 tablet 0     Sig: Take 1 tablet by mouth daily.     Date Last Fill: 3/12/2020  Is patient out of medication?:  Yes  Patient notified refills processed within 3 business days:  Yes  [facility]  Submit electronically to pharmacy  Controlled Substance Agreement on file:   Encounter-Level CSA Scan Date:    There are no encounter-level csa scan date.        Last office visit:  1/16/2020

## 2021-06-08 NOTE — TELEPHONE ENCOUNTER
Who is calling:  The patient   Reason for Call:  The patient would like the prescription refills for the Adderal 10MG's and the Adderal XR 20MG's filled at the [facility]'s in [place], [place] instead of the [facility] in [place].   Date of last appointment with primary care:   Okay to leave a detailed message: Yes

## 2021-06-08 NOTE — TELEPHONE ENCOUNTER
Left detailed message for patient advising to please call back and get something scheduled with Dr. Salazar on Monday or any day next week that works for the patient.     Please assist scheduling a video visit when patient calls back.     KIMO, CHUCKIE

## 2021-06-08 NOTE — TELEPHONE ENCOUNTER
Called [place] pharmacy and confirmed that the Rx was canceled.  Can you please resend to correct pharmacy.

## 2021-06-09 NOTE — TELEPHONE ENCOUNTER
Controlled Substance Refill Request  Medication Name:   Requested Prescriptions     Pending Prescriptions Disp Refills     dextroamphetamine-amphetamine (ADDERALL XR) 20 MG 24 hr capsule 30 capsule 0     Sig: Take 1 capsule (20 mg total) by mouth daily.     dextroamphetamine-amphetamine (ADDERALL) 10 mg Tab tablet 30 tablet 0     Sig: Take 1 tablet by mouth daily.     Date Last Fill: 5/20/20  Is patient out of medication?:  Yes  Patient notified refills processed within 3 business days:  Yes  [facility]  Submit electronically to pharmacy  Controlled Substance Agreement on file:   Encounter-Level CSA Scan Date:    There are no encounter-level csa scan date.        Last office visit:  6/5/20

## 2021-06-10 NOTE — TELEPHONE ENCOUNTER
Controlled Substance Refill Request  Medication Name:   Requested Prescriptions     Pending Prescriptions Disp Refills     dextroamphetamine-amphetamine (ADDERALL XR) 20 MG 24 hr capsule 30 capsule 0     Sig: Take 1 capsule (20 mg total) by mouth daily.     dextroamphetamine-amphetamine (ADDERALL) 10 mg Tab tablet 30 tablet 0     Sig: Take 1 tablet by mouth daily.     Date Last Fill: 7/6/2020  Is patient out of medication?:  Yes  Patient notified refills processed within 3 business days:  Yes  [facility]  Submit electronically to pharmacy  Controlled Substance Agreement on file:   Encounter-Level CSA Scan Date:    There are no encounter-level csa scan date.        Last office visit:  6/5/2020, virtual visit

## 2021-06-11 NOTE — TELEPHONE ENCOUNTER
Who is requesting the letter?  Employer/Patient  Why is the letter needed? Pt needs a letter certifying that through the state Pt can receive Adderall.  Pt needs this as part of employment verification. Please advise.  How would you like this letter returned? Mychart  Okay to leave a detailed message? No

## 2021-06-11 NOTE — TELEPHONE ENCOUNTER
"Luis Enrique reports having chronic disability and stomach issues. Currently stomach is \"sore and raw\" feeling, exhausted. 7/10 pain, both sides, just below waistline. About the same for the past 1 to 1.5 days. Calling now because he is at work and feels like he needs to leave but needs documentation for work. Per protocol FNA advised being seen within 4 hours at an ER due to the time.       He would also like a medication refill for the 20mg Adderall as he is out. FNA will route note to Dr. Mata to address.     Patient voiced understanding and will follow disposition.  Yesenia Valenzuela  [facility] Nurse Advisor             Additional Information    Negative: Shock suspected (e.g., cold/pale/clammy skin, too weak to stand, low BP, rapid pulse)    Negative: Difficult to awaken or acting confused (e.g., disoriented, slurred speech)    Negative: Passed out (i.e., lost consciousness, collapsed and was not responding)    Negative: Sounds like a life-threatening emergency to the triager    Negative: Chest pain    Negative: Pain is mainly in upper abdomen  (if needed ask: \"is it mainly above the belly button?\")    Negative: Followed an abdomen (stomach) injury    Negative: [1] SEVERE pain (e.g., excruciating) AND [2] present > 1 hour    Negative: [1] SEVERE pain AND [2] age > 60    Negative: [1] Vomiting AND [2] contains red blood or black (\"coffee ground\") material  (Exception: few red streaks in vomit that only happened once)    Negative: Blood in bowel movements  (Exception: Blood on surface of BM with constipation)    Negative: Black or tarry bowel movements  (Exception: chronic-unchanged  black-grey bowel movements AND is taking iron pills or Pepto-bismol)    Negative: [1] Unable to urinate (or only a few drops) > 4 hours AND [2] bladder feels very full (e.g., palpable bladder or strong urge to urinate)    Negative: [1] Pain in the scrotum or testicle AND [2] present > 1 hour    Negative: Patient sounds very sick or weak to " the triager    [1] MILD-MODERATE pain AND [2] constant AND [3] present > 2 hours    Protocols used: ABDOMINAL PAIN - MALE-A-AH

## 2021-06-11 NOTE — TELEPHONE ENCOUNTER
Who is calling:  Patient  Reason for Call:    Patient states that everything is the same.  He missed the registration date.  He thought he had a couple days left to register.  Date of last appointment with primary care: 6/5/2020  Okay to leave a detailed message: Yes

## 2021-06-11 NOTE — TELEPHONE ENCOUNTER
Note written, available in communication tab.  Adderall prescription was refilled.  Will Need follow-up ov before next refill.

## 2021-06-11 NOTE — TELEPHONE ENCOUNTER
Who is calling:  Patient   Reason for Call:  Patient states medical cannabis is ended yesterday . Patient is requesting to send re- certification  For medical cannabis today As soon as possible  .  Date of last appointment with primary care: 06/05/20  Okay to leave a detailed message: No

## 2021-06-12 NOTE — TELEPHONE ENCOUNTER
Left message to call back for: Patient  Information to relay to patient:  Please scheduled OV w/Dr. Mata on Monday.

## 2021-06-12 NOTE — TELEPHONE ENCOUNTER
"Past 3 weeks/has abdominal pain with diarrhea/ had 3 episodes today/ rates pain as a \"6\"/has not worked for 3 weeks/ has to taste food at work and he thinks that is what is causing his difficulty/ he has an appointment with his regular clinic for 11/03 and he will keep that. Brother will drive him  DYLON Willis    Additional Information    Negative: Passed out (i.e., fainted, collapsed and was not responding)    Negative: Shock suspected (e.g., cold/pale/clammy skin, too weak to stand, low BP, rapid pulse)    Negative: Sounds like a life-threatening emergency to the triager    Negative: Chest pain    Negative: Pain is mainly in upper abdomen (if needed ask: 'is it mainly above the belly button?')    Negative: SEVERE abdominal pain (e.g., excruciating)    Negative: Vomiting red blood or black (coffee ground) material    Negative: Bloody, black, or tarry bowel movements    Negative: Unable to urinate (or only a few drops) and bladder feels very full    Negative: Pain in scrotum persists > 1 hour    Constant abdominal pain lasting > 2 hours    Protocols used: ABDOMINAL PAIN - MALE-A-OH      "

## 2021-06-12 NOTE — TELEPHONE ENCOUNTER
Who is calling:  Patient   Reason for Call:  Returning phone call would like to be fit in on Monday around 3 or 330 and if not could be squeezed in on Tuesday   Date of last appointment with primary care: n/a   Okay to leave a detailed message: No
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
## 2021-06-12 NOTE — TELEPHONE ENCOUNTER
New Appointment Needed  What is the reason for the visit:    Update on disability for work  Provider Preference: PCP only  How soon do you need to be seen?: As soon as possible  Waitlist offered?: No  Okay to leave a detailed message:  Yes    Patient's employer is requiring him he have a note from his doctor to exclude him for particular duties.  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  Patient has IBS.  Eating the fries leads to severe diarrhea.  Employer will not excuse him from this duty until he gets note from doctor.      Patient needs an appointment as soon as possible so he can keep his job.

## 2021-06-13 NOTE — TELEPHONE ENCOUNTER
Reason for Call:  Medication or medication refill:    Do you use a [facility]?  Name of the pharmacy and phone number for the current request: [facility] [phone]  [address]    Name of the medication requested: adderall 20 mg and adderall 10 mg    Other request: none    Can we leave a detailed message on this number? Yes    Phone number patient can be reached at: Home number on file [phone] (home)    Best Time: anytime    Call taken on 12/21/2020 at 1:39 PM by Mariluz Osorio

## 2021-06-15 NOTE — TELEPHONE ENCOUNTER
Reason for Call:  Medication or medication refill:    Do you use a [facility]?  Name of the pharmacy and phone number for the current request: [facility] [address] [address]   [phone] store [id number]    Name of the medication requested: adderall 20mg DX  adderall 10 mg   Other request: no  Has been out x 2 months due to no insurance coverage    Can we leave a detailed message on this number? Yes    Phone number patient can be reached at: Home number on file [phone] (home)    Best Time: afternoon     Call taken on 3/9/2021 at 2:07 PM by Mariluz Osorio

## 2021-06-15 NOTE — TELEPHONE ENCOUNTER
Called and spoke with Luis Enrique Rosario , Message was given, Luis Enrique Rosario  understood, no further questions.

## 2021-06-15 NOTE — TELEPHONE ENCOUNTER
Patient called and request refills for     Requested Prescriptions     Pending Prescriptions Disp Refills     dextroamphetamine-amphetamine (ADDERALL) 10 mg Tab tablet 30 tablet 0     Sig: Take 1 tablet by mouth daily.     dextroamphetamine-amphetamine (ADDERALL XR) 20 MG 24 hr capsule 30 capsule 0     Sig: Take 1 capsule (20 mg total) by mouth daily.     Medication refill requested. Please authorize medication if appropriate.     Patient is completely out of his medications.

## 2021-06-15 NOTE — PROGRESS NOTES
Luis Enrique Rosario is a 40 y.o. male who is being evaluated via a billable video visit.      How would you like to obtain your AVS? MyChart.  If dropped from the video visit, the video invitation should be resent by: Text to cell phone: [phone]   Will anyone else be joining your video visit? No      Video Start Time: 09:40 am video duration about 10 minutes, phone conversation about 10 minutes.  Total of 10 minutes.  Assessment & Plan     PTSD (post-traumatic stress disorder)  Mild major depression (H)  Need to follow with psychiatrist.    Attention deficit hyperactivity disorder (ADHD), predominantly hyperactive type  Adderall XR 20 mg daily and 10 mg in the afternoon.  Seems to be tolerating well, continue to monitor consider tapering down if symptoms improved.  - Pain Management Drug Panel, Urine; Future    Other long term (current) drug therapy   - Pain Management Drug Panel, Urine; Future    Review of external notes as documented above       20 minutes spent on the date of the encounter doing chart review, patient visit and documentation            No follow-ups on file.    Nya Mata MD  [facility]     Luis Enrique Rosario is 40 y.o. and presents to clinic today for the following health issues   HPI   Dose of ADHD, currently taking Adderall xr 20 mg daily, Adderall short acting 10 mg daily, did trial of medication about a week ago, patient focusing starting getting worse difficulty concentrating and completing his paperwork, currently applying for disability, was unable to complete his Social Security paperwork.  He does have posttraumatic stress disorder, depression, currently taking medical cannabis medication does help him relax less depressed.  He is due for renewal, has seen Dr. Salazar in the past for  renewal certification.      Review of Systems  As per HPI otherwise negative      Objective       Vitals:  No vitals were obtained today due to virtual  visit.    Physical Exam  Thought process and language is adequate, no apparent distress.          Video-Visit Details    Type of service:  Video Visit    Video End Time (time video stopped): 09:10 am video visit was about 10 minutes, changed to phone conversation was about 10 minutes because of poor video function.  Originating Location (pt. Location): Home    Distant Location (provider location):  [facility]     Platform used for Video Visit: Boxever

## 2021-06-16 ENCOUNTER — COMMUNICATION - HEALTHEAST (OUTPATIENT)
Dept: SCHEDULING | Facility: CLINIC | Age: 41
End: 2021-06-16

## 2021-06-16 PROBLEM — M47.812 SPONDYLOSIS OF CERVICAL REGION WITHOUT MYELOPATHY OR RADICULOPATHY: Status: ACTIVE | Noted: 2020-01-16

## 2021-06-16 PROBLEM — M51.26 LUMBAR DISC HERNIATION: Status: ACTIVE | Noted: 2020-01-16

## 2021-06-16 NOTE — TELEPHONE ENCOUNTER
Pt called back again today asking for the rx to pl;ease be signed and faxed over to the [facility] pharmacy  He is compleltly out of meds

## 2021-06-16 NOTE — TELEPHONE ENCOUNTER
Pt called and said that his prescriptions that was sent for his Adderall was sent to the wrong pharmacy. Per pt, the correct pharmacy should be [facility] in [place]. Pt's pharmacy has been updated.     Called the [facility] pharmacy located in [place], [place] and cancelled the 2 prescriptions that was sent yesterday.    I've re set them up to go to the correct pharmacy. Please resend.   Thanks.

## 2021-06-16 NOTE — TELEPHONE ENCOUNTER
DOD: pt called back to check on the status of the refill. Dr. Mata sent to the wrong pharmacy and so I had re-set them up to go to the right pharmacy. Thanks.

## 2021-06-16 NOTE — TELEPHONE ENCOUNTER
Reason for Call:  Medication or medication refill:    Do you use a [facility]?  Name of the pharmacy and phone number for the current request: [facility] in [place], [place]. [address] [facility] [address] [phone]    Name of the medication requested: dextroamphetamine-amphetamine (ADDERALL XR) 20 MG 24 hr capsule / dextroamphetamine-amphetamine (ADDERALL) 10 mg Tab tablet    Other request: Patient called to request to refill on these medications. Please send refills to pharmacy where he is located. Patient is on his pill today, he will not have any medication starting tomorrow.    Can we leave a detailed message on this number? Yes    Phone number patient can be reached at: Home number on file [phone] (home)    Best Time: any    Call taken on 4/7/2021 at 2:45 PM by Shaina Scott

## 2021-06-17 NOTE — TELEPHONE ENCOUNTER
Reason for Call:  Medication or medication refill:    Do you use a [facility]?  Name of the pharmacy and phone number for the current request: [facility] [phone]    Name of the medication requested: adderall 20mg    adderall 10 mg  Other request: pt will run out in a day please ok asap     Can we leave a detailed message on this number? Yes    Phone number patient can be reached at:   Cell number on file:    Telephone Information:   Mobile [phone]       Best Time: anytime    Call taken on 5/11/2021 at 2:55 PM by Mariluz Osorio

## 2021-06-17 NOTE — TELEPHONE ENCOUNTER
Pt is calling to say he received the 10 mg adderall but not the 20,,, he also said this is the 3rd time this has happened

## 2021-06-19 NOTE — LETTER
Letter by Nya Mata MD at      Author: Nya Mata MD Service: -- Author Type: --    Filed:  Encounter Date: 3/27/2019 Status: (Other)         March 28, 2019     Patient: Luis Enrique Rosario   YOB: 1980   Date of Visit: 3/27/2019       To Whom it May Concern:    Luis Enrique Rosario was seen in my clinic on 3/27/2019.    If you have any questions or concerns, please don't hesitate to call.    Sincerely,         Electronically signed by Nya Mata MD

## 2021-06-19 NOTE — LETTER
Letter by Nya Mata MD at      Author: Nya Mata MD Service: -- Author Type: --    Filed:  Encounter Date: 3/27/2019 Status: (Other)         [facility] FAMILY MEDICINE/OB  03/27/19    Patient: Luis Enrique Rosario  YOB: 1980  Medical Record Number: [id number]  CSN: [id number]                                                                              Non-opioid Controlled Substance Agreement    I understand that my care provider has prescribed a controlled substance to help manage my condition(s). I am taking this medicine to help me function or work. I know this is strong medicine, and that it can cause serious side effects. Controlled substances can be sedating, addicting and may cause a dependency on the drug. They can affect my ability to drive or think, and cause depression. They need to be taken exactly as prescribed. Combining controlled substances with certain medicines or chemicals (such as cocaine, sedatives and tranquilizers, sleeping pills, meth) can be dangerous or even fatal. Also, if I stop controlled substances suddenly, I may have severe withdrawal symptoms.  If not helpful, I may be asked to stop them.    The risks, benefits, and side effects of these medicine(s) were explained to me. I agree that:    1. I will take part in other treatments as advised by my care team. This may be psychiatry or counseling, physical therapy, behavioral therapy, group treatment or a referral to a pain clinic. I will reduce or stop my medicine when my care team tells me to do so.  2. I will take my medicines as prescribed. I will not change the dose or schedule unless my care team tells me to. There will be no refills if I run out early.  I may be contactedwithout warning and asked to complete a urine drug test or pill count at any time.   3. I will keep all my appointments, and understand this is part of the monitoring of controlled substances. My care team may require  an office visit for EVERY controlled substance refill. If I miss appointments or dont follow instructions, my care team may stop my medicine.  4. I will not ask other providers to prescribe controlled substances, and I will not accept controlled substances from other people. If I need another prescribed controlled substance for a new reason, I will tell my care team within 1 business day.  5. I will use one pharmacy to fill all of my controlled substance prescriptions, and it is up to me to make sure that I do not run out of my medicines on weekends or holidays. If my care team is willing to refill my controlled substance prescription without a visit, I must request refills only during office hours, refills may take up to 3 days to process, and it may take up to 5 to 7 days for my medicine to be mailed and ready at my pharmacy. Prescriptions will not be mailed anywhere except my pharmacy.    6. I am responsible for my prescriptions. If the medicine/prescription is lost or stolen, it will not be replaced. I also agree not to share controlled substance medicines with anyone.          [facility] FAMILY MEDICINE/OB  03/27/19  Patient:  Luis Enrique Rosario  YOB: 1980  Medical Record Number: [id number]  CSN: [id number]    7. I agree to not use ANY illegal or recreational drugs. This includes marijuana, cocaine, bath salts or other drugs. I agree not to use alcohol unless my care team says I may. I agree to give urine samples whenever asked. If I dont give a urine sample, the care team may stop my medicine.    8. If I enroll in the [place] Medical Marijuana program, I will tell my care team. I will also sign an agreement to share my medical records with my care team.    9. I will bring in my list of medicines (or my medicine bottles) each time I come to the clinic.   10. I will tell my care team right away if I become pregnant or have a new medical problem treated outside of my regular clinic.  11. I  understand that this medicine can affect my thinking and judgment. It may be unsafe for me to drive, use machinery and do dangerous tasks. I will not do any of these things until I know how the medicine affects me. If my dose changes, I will wait to see how it affects me. I will contact my care team if I have concerns about medicine side effects.    I understand that if I do not follow any of the conditions above, my prescriptions or treatment may be stopped.      I agree that my provider, clinic care team, and pharmacy may work with any city, state or federal law enforcement agency that investigates the misuse, sale, or other diversion of my controlled medicine. I will allow my provider to discuss my care with or share a copy of this agreement with any other treating provider, pharmacy or emergency room where I receive care. I agree to give up (waive) any right of privacy or confidentiality with respect to these consents.   I have read this agreement and have asked questions about anything I did not understand.    ___________________________________________________________________________  Patient signature - Date/Time  -Luis Enrique Rosario                                      ___________________________________________________________________________  Witness signature                                                                    ___________________________________________________________________________  Provider signature- Nya Mata MD

## 2021-06-20 NOTE — LETTER
Letter by Hua Salazar MD at      Author: Hua Salazar MD Service: -- Author Type: --    Filed:  Encounter Date: 10/2/2020 Status: (Other)         October 2, 2020     Patient: Luis Enrique Rosario   YOB: 1980   Date of Visit: 10/2/2020       To Whom It May Concern:    It is my medical opinion that Luis Enrique Rosario may return to full duty immediately with no restrictions.     Luis Enrique is followed in our clinic by Dr. Allen Mata and myself.    His is prescribed and adherence to Adderall (Amphetamine / Dextroamphetamine) and Medical Cannabis, both are recommend for Luis Enrique for qualified medical conditions.  Future drug testing and urinalysis may detect THC and Amphetamine, which would be expected from these prescription medications.        If you have any questions or concerns, please don't hesitate to call.    Sincerely,        Electronically signed by Hua Salazar MD

## 2021-06-20 NOTE — LETTER
Letter by Nya Mata MD at      Author: Nya Mata MD Service: -- Author Type: --    Filed:  Encounter Date: 9/3/2020 Status: (Other)         September 9, 2020     Patient: Luis Enrique Rosario   YOB: 1980   Date of Visit: 9/3/2020       To Whom It May Concern:    It is my medical opinion that Luis Enrique Rosario is currently taking Adderall.    If you have any questions or concerns, please don't hesitate to call.    Sincerely,        Electronically signed by Nya Mata MD

## 2021-06-20 NOTE — LETTER
Letter by Nya Mata MD at      Author: Nya Mata MD Service: -- Author Type: --    Filed:  Encounter Date: 1/16/2020 Status: Signed                    My Depression Action Plan  Name: Luis Enrique Rosario   Date of Birth 1980  Date: 1/16/2020    My Doctor: Nya Mata MD   My Clinic: [facility] FAMILY MEDICINE/OB  [address] [address]  [phone]          GREEN    ZONE   Good Control    What it looks like:     Things are going generally well. You have normal ups and downs. You may even feel depressed from time to time, but bad moods usually last less than a day.   What you need to do:  1. Continue to care for yourself (see self care plan)  2. Check your depression survival kit and update it as needed  3. Follow your physicians recommendations including any medication.  4. Do not stop taking medication unless you consult with your physician first.           YELLOW         ZONE Getting Worse    What it looks like:     Depression is starting to interfere with your life.     It may be hard to get out of bed; you may be starting to isolate yourself from others.    Symptoms of depression are starting to last most all day and this has happened for several days.     You may have suicidal thoughts but they are not constant.   What you need to do:     1. Call your care team. Your response to treatment will improve if you keep your care team informed of your progress. Yellow periods are signs an adjustment may need to be made.     2. Continue your self-care.  Just get dressed and ready for the day.  Don't give yourself time to talk yourself out of it.    3. Talk to someone in your support network.    4. Open up your depression Depression Self-Care Plan / Wellness kit.           RED    ZONE Medical Alert - Get Help    What it looks like:     Depression is seriously interfering with your life.     You may experience these or other symptoms: You cant get out  of bed most days, cant work or engage in other necessary activities, you have trouble taking care of basic hygiene, or basic responsibilities, thoughts of suicide or death that will not go away, self-injurious behavior.     What you need to do:  1. Call your care team and request a same-day appointment. If they are not available (weekends or after hours) call your local crisis line, emergency room or 911.            Self-Care Plan / Wellness Kit    Self-Care for Depression  Heres the deal. Your body and mind are really not as separate as most people think.  What you do and think affects how you feel and how you feel influences what you do and think. This means if you do things that people who feel good do, it will help you feel better.  Sometimes this is all it takes.  There is also a place for medication and therapy depending on how severe your depression is, so be sure to consult with your medical provider and/ or Behavioral Health Consultant if your symptoms are worsening or not improving.     In order to better manage my stress, I will:    Exercise  Get some form of exercise, every day. This will help reduce pain and release endorphins, the feel good chemicals in your brain. This is almost as good as taking antidepressants!  This is not the same as joining a gym and then never going! (they count on that by the way?) It can be as simple as just going for a walk or doing some gardening, anything that will get you moving.      Hygiene   Maintain good hygiene (get out of bed in the morning, make your bed, brush your teeth, take a shower, and get dressed like you were going to work, even if you are unemployed).  If your clothes don't fit try to get ones that do.    Diet  Strive to eat foods that are good for me, drink plenty of water, and avoid excessive sugar, caffeine, alcohol, and other mood-altering substances.  Some foods that are helpful in depression are: complex carbohydrates, B vitamins, flaxseed, fish or  fish oil, fresh fruits and vegetables.    Psychotherapy  Agree to participate in Individual Therapy (if recommended).    Medication  If prescribed medications, I agree to take them.  Missing doses can result in serious side effects.  I understand that drinking alcohol, or other illicit drug use, may cause potential side effects.  I will not stop my medication abruptly without first discussing it with my provider.    Staying Connected With Others  Stay in touch with my friends, family members, and my primary care provider/team.    Use your imagination  Be creative.  We all have a creative side; it doesnt matter if its oil painting, sand castles, or mud pies! This will also kick up the endorphins.    Witness Beauty  (AKA stop and smell the roses) Take a look outside, even in mid-winter. Notice colors, textures. Watch the squirrels and birds.     Service to others  Be of service to others.  There is always someone else in need.  By helping others we can get out of ourselves and remember the really important things.  This also provides opportunities for practicing all the other parts of the program.    Humor  Laugh and be silly!  Adjust your TV habits for less news and crime-drama and more comedy.    Control your stress  Try breathing deep, massage therapy, biofeedback, and meditation. Find time to relax each day.     Crisis Text Line  http://www.crisistextline.org    The Crisis Text Line serves anyone, in any type of crisis, providing access to free, 24/7 support and information via the medium people already use and trust:    Here's how it works:  1.  Text 848-199 from anywhere in the [place], anytime, about any type of crisis.  2.  A live, trained Crisis Counselor receives the text and responds quickly.  3.  The volunteer Crisis Counselor will help you move from a 'hot moment to a cool moment'.  My support system    Clinic Contact:  Phone number:    Contact 1:  Phone number:    Contact 2:  Phone number:     [employer]/:  Phone number:    Therapist:  Phone number:    [facility] crisis center:    Phone number:    Other community support:  Phone number:

## 2021-06-20 NOTE — LETTER
Letter by Yesenia Valenzuela RN at      Author: Yesenia Valenzuela RN Service: -- Author Type: --    Filed:  Encounter Date: 9/23/2020 Status: (Other)         September 25, 2020     Patient: Luis Enrique Rosario   YOB: 1980   Date of Visit: 9/23/2020       To Whom It May Concern:    It is my medical opinion that Luis Enrique Rosario did not feel well on September 23 and had to leave, please excuse him from missing work.    If you have any questions or concerns, please don't hesitate to call.    Sincerely,        Electronically signed by MD Esperanza

## 2021-06-20 NOTE — LETTER
Letter by Nya Mata MD at      Author: Nya Mata MD Service: -- Author Type: --    Filed:  Encounter Date: 1/16/2020 Status: Signed         [facility] FAMILY MEDICINE/OB  01/16/20    Patient: Luis Enrique Rosario  YOB: 1980  Medical Record Number: [id number]  CSN: [id number]                                                                              Non-opioid Controlled Substance Agreement    I understand that my care provider has prescribed a controlled substance to help manage my condition(s). I am taking this medicine to help me function or work. I know this is strong medicine, and that it can cause serious side effects. Controlled substances can be sedating, addicting and may cause a dependency on the drug. They can affect my ability to drive or think, and cause depression. They need to be taken exactly as prescribed. Combining controlled substances with certain medicines or chemicals (such as cocaine, sedatives and tranquilizers, sleeping pills, meth) can be dangerous or even fatal. Also, if I stop controlled substances suddenly, I may have severe withdrawal symptoms.  If not helpful, I may be asked to stop them.    The risks, benefits, and side effects of these medicine(s) were explained to me. I agree that:    1. I will take part in other treatments as advised by my care team. This may be psychiatry or counseling, physical therapy, behavioral therapy, group treatment or a referral to a pain clinic. I will reduce or stop my medicine when my care team tells me to do so.  2. I will take my medicines as prescribed. I will not change the dose or schedule unless my care team tells me to. There will be no refills if I run out early.  I may be contactedwithout warning and asked to complete a urine drug test or pill count at any time.   3. I will keep all my appointments, and understand this is part of the monitoring of controlled substances. My care team may require  an office visit for EVERY controlled substance refill. If I miss appointments or dont follow instructions, my care team may stop my medicine.  4. I will not ask other providers to prescribe controlled substances, and I will not accept controlled substances from other people. If I need another prescribed controlled substance for a new reason, I will tell my care team within 1 business day.  5. I will use one pharmacy to fill all of my controlled substance prescriptions, and it is up to me to make sure that I do not run out of my medicines on weekends or holidays. If my care team is willing to refill my controlled substance prescription without a visit, I must request refills only during office hours, refills may take up to 3 days to process, and it may take up to 5 to 7 days for my medicine to be mailed and ready at my pharmacy. Prescriptions will not be mailed anywhere except my pharmacy.    6. I am responsible for my prescriptions. If the medicine/prescription is lost or stolen, it will not be replaced. I also agree not to share controlled substance medicines with anyone.          [facility] FAMILY MEDICINE/OB  01/16/20  Patient:  Luis Enrique Rosario  YOB: 1980  Medical Record Number: [id number]  CSN: [id number]    7. I agree to not use ANY illegal or recreational drugs. This includes marijuana, cocaine, bath salts or other drugs. I agree not to use alcohol unless my care team says I may. I agree to give urine samples whenever asked. If I dont give a urine sample, the care team may stop my medicine.    8. If I enroll in the [place] Medical Marijuana program, I will tell my care team. I will also sign an agreement to share my medical records with my care team.    9. I will bring in my list of medicines (or my medicine bottles) each time I come to the clinic.   10. I will tell my care team right away if I become pregnant or have a new medical problem treated outside of my regular clinic.  11. I  understand that this medicine can affect my thinking and judgment. It may be unsafe for me to drive, use machinery and do dangerous tasks. I will not do any of these things until I know how the medicine affects me. If my dose changes, I will wait to see how it affects me. I will contact my care team if I have concerns about medicine side effects.    I understand that if I do not follow any of the conditions above, my prescriptions or treatment may be stopped.      I agree that my provider, clinic care team, and pharmacy may work with any city, state or federal law enforcement agency that investigates the misuse, sale, or other diversion of my controlled medicine. I will allow my provider to discuss my care with or share a copy of this agreement with any other treating provider, pharmacy or emergency room where I receive care. I agree to give up (waive) any right of privacy or confidentiality with respect to these consents.   I have read this agreement and have asked questions about anything I did not understand.    ___________________________________________________________________________  Patient signature - Date/Time  -Luis Enrique Rosario                                      ___________________________________________________________________________  Witness signature                                                                    ___________________________________________________________________________  Provider signature- Nya Mata MD

## 2021-06-21 NOTE — LETTER
Letter by Nya Mata MD at      Author: Nya Mata MD Service: -- Author Type: --    Filed:  Encounter Date: 2/8/2021 Status: (Other)         [facility]  02/08/21    Patient: Luis Enrique Rosario  YOB: 1980  Medical Record Number: [id number]  CSN: [id number]                                                                              Non-opioid Controlled Substance Agreement    I understand that my care provider has prescribed a controlled substance to help manage my condition(s). I am taking this medicine to help me function or work. I know this is strong medicine, and that it can cause serious side effects. Controlled substances can be sedating, addicting and may cause a dependency on the drug. They can affect my ability to drive or think, and cause depression. They need to be taken exactly as prescribed. Combining controlled substances with certain medicines or chemicals (such as cocaine, sedatives and tranquilizers, sleeping pills, meth) can be dangerous or even fatal. Also, if I stop controlled substances suddenly, I may have severe withdrawal symptoms.  If not helpful, I may be asked to stop them.    The risks, benefits, and side effects of these medicine(s) were explained to me. I agree that:    1. I will take part in other treatments as advised by my care team. This may be psychiatry or counseling, physical therapy, behavioral therapy, group treatment or a referral to a pain clinic. I will reduce or stop my medicine when my care team tells me to do so.  2. I will take my medicines as prescribed. I will not change the dose or schedule unless my care team tells me to. There will be no refills if I run out early.  I may be contactedwithout warning and asked to complete a urine drug test or pill count at any time.   3. I will keep all my appointments, and understand this is part of the monitoring of controlled substances. My care team may require an  office visit for EVERY controlled substance refill. If I miss appointments or dont follow instructions, my care team may stop my medicine.  4. I will not ask other providers to prescribe controlled substances, and I will not accept controlled substances from other people. If I need another prescribed controlled substance for a new reason, I will tell my care team within 1 business day.  5. I will use one pharmacy to fill all of my controlled substance prescriptions, and it is up to me to make sure that I do not run out of my medicines on weekends or holidays. If my care team is willing to refill my controlled substance prescription without a visit, I must request refills only during office hours, refills may take up to 3 days to process, and it may take up to 5 to 7 days for my medicine to be mailed and ready at my pharmacy. Prescriptions will not be mailed anywhere except my pharmacy.    6. I am responsible for my prescriptions. If the medicine/prescription is lost or stolen, it will not be replaced. I also agree not to share controlled substance medicines with anyone.          [facility]  02/08/21  Patient:  Luis Enrique Rosario  YOB: 1980  Medical Record Number: [id number]  CSN: [id number]    7. I agree to not use ANY illegal or recreational drugs. This includes marijuana, cocaine, bath salts or other drugs. I agree not to use alcohol unless my care team says I may. I agree to give urine samples whenever asked. If I dont give a urine sample, the care team may stop my medicine.    8. If I enroll in the [place] Medical Marijuana program, I will tell my care team. I will also sign an agreement to share my medical records with my care team.    9. I will bring in my list of medicines (or my medicine bottles) each time I come to the clinic.   10. I will tell my care team right away if I become pregnant or have a new medical problem treated outside of my regular clinic.  11. I  understand that this medicine can affect my thinking and judgment. It may be unsafe for me to drive, use machinery and do dangerous tasks. I will not do any of these things until I know how the medicine affects me. If my dose changes, I will wait to see how it affects me. I will contact my care team if I have concerns about medicine side effects.    I understand that if I do not follow any of the conditions above, my prescriptions or treatment may be stopped.      I agree that my provider, clinic care team, and pharmacy may work with any city, state or federal law enforcement agency that investigates the misuse, sale, or other diversion of my controlled medicine. I will allow my provider to discuss my care with or share a copy of this agreement with any other treating provider, pharmacy or emergency room where I receive care. I agree to give up (waive) any right of privacy or confidentiality with respect to these consents.   I have read this agreement and have asked questions about anything I did not understand.    ___________________________________________________________________________  Patient signature - Date/Time  -Luis Enrique Rosario                                      ___________________________________________________________________________  Witness signature                                                                    ___________________________________________________________________________  Provider signature- Nya Mata MD

## 2021-06-23 ENCOUNTER — COMMUNICATION - HEALTHEAST (OUTPATIENT)
Dept: FAMILY MEDICINE | Facility: CLINIC | Age: 41
End: 2021-06-23

## 2021-06-23 DIAGNOSIS — F90.1 ATTENTION DEFICIT HYPERACTIVITY DISORDER (ADHD), PREDOMINANTLY HYPERACTIVE TYPE: ICD-10-CM

## 2021-06-24 RX ORDER — DEXTROAMPHETAMINE SACCHARATE, AMPHETAMINE ASPARTATE MONOHYDRATE, DEXTROAMPHETAMINE SULFATE AND AMPHETAMINE SULFATE 5; 5; 5; 5 MG/1; MG/1; MG/1; MG/1
20 CAPSULE, EXTENDED RELEASE ORAL DAILY
Qty: 31 CAPSULE | Refills: 0 | Status: SHIPPED | OUTPATIENT
Start: 2021-06-24 | End: 2021-12-07

## 2021-06-24 RX ORDER — DEXTROAMPHETAMINE SACCHARATE, AMPHETAMINE ASPARTATE, DEXTROAMPHETAMINE SULFATE AND AMPHETAMINE SULFATE 2.5; 2.5; 2.5; 2.5 MG/1; MG/1; MG/1; MG/1
10 TABLET ORAL DAILY
Qty: 30 TABLET | Refills: 0 | Status: SHIPPED | OUTPATIENT
Start: 2021-06-24 | End: 2021-12-07

## 2021-06-25 NOTE — TELEPHONE ENCOUNTER
Johathon calls in to report that he has symptoms last night for about 2 hours of blurry vision and nausea. He presented to eye care specialist and has been diagnosed with cataracts in both eyes and also having pressure issues in his eyes that he states is glaucoma.  He has 2100 vision in his left eye and some cataract in his right eye.  He is feeling better today.  Just  Wanted Dr. Mata to be aware of the situation and if provider  Thinks he should follow up with him or if he has any advice for what to watch for can call and leave detailed message at [phone].

## 2021-06-25 NOTE — TELEPHONE ENCOUNTER
Just follow the recommendation from the eye doctors, make an appointment for an annual wellness in the late summer but feel free to come in sooner if there is any new issues.

## 2021-06-26 ENCOUNTER — HEALTH MAINTENANCE LETTER (OUTPATIENT)
Age: 41
End: 2021-06-26

## 2021-06-26 NOTE — TELEPHONE ENCOUNTER
Controlled Substance Refill Request  Medication Name:   Requested Prescriptions     Pending Prescriptions Disp Refills     dextroamphetamine-amphetamine (ADDERALL XR) 20 MG 24 hr capsule 31 capsule 0     Sig: Take 1 capsule (20 mg total) by mouth daily.     dextroamphetamine-amphetamine (ADDERALL) 10 mg Tab tablet 30 tablet 0     Sig: Take 1 tablet by mouth daily.     Date Last Fill: 5/17/21  [facility]ed [facility]  Submit electronically to pharmacy  Controlled Substance Agreement on file:   Encounter-Level CSA Scan Date:    There are no encounter-level csa scan date.        Last office visit:  2/8/21  Lila Michel RN, MA  [facility]    Triage Nurse Advisor

## 2021-07-03 NOTE — ADDENDUM NOTE
Addendum Note by Nya Mata MD at 8/13/2019  5:30 PM     Author: Nya Mata MD Service: -- Author Type: Physician    Filed: 8/13/2019  5:30 PM Encounter Date: 8/13/2019 Status: Signed    : Nya Mata MD (Physician)    Addended by: NYA MATA on: 8/13/2019 05:30 PM        Modules accepted: Orders

## 2021-07-03 NOTE — ADDENDUM NOTE
Addendum Note by Nya Mata MD at 9/25/2020 10:21 AM     Author: Nya Mata MD Service: -- Author Type: Physician    Filed: 9/25/2020 10:21 AM Encounter Date: 9/23/2020 Status: Signed    : Nya Mata MD (Physician)    Addended by: NYA MATA on: 9/25/2020 10:21 AM        Modules accepted: Orders

## 2021-07-03 NOTE — ADDENDUM NOTE
Addendum Note by Yoav Berman CMA at 8/16/2019 10:18 AM     Author: Yoav Berman CMA Service: -- Author Type: Certified Medical Assistant    Filed: 8/16/2019 10:18 AM Encounter Date: 8/13/2019 Status: Signed    : Yoav Berman CMA (Certified Medical Assistant)    Addended by: YOAV BERMAN on: 8/16/2019 10:18 AM        Modules accepted: Orders

## 2021-07-12 ENCOUNTER — COMMUNICATION - HEALTHEAST (OUTPATIENT)
Dept: SCHEDULING | Facility: CLINIC | Age: 41
End: 2021-07-12

## 2021-07-12 NOTE — TELEPHONE ENCOUNTER
"Telephone Encounter by Melita Mendez RN at 7/12/2021  9:07 AM     Author: Melita Mendez, RN Service: -- Author Type: Registered Nurse    Filed: 7/12/2021  9:35 AM Encounter Date: 7/12/2021 Status: Signed    : Melita Mendez RN (Registered Nurse)       Update for Dr Mata.    In past 2 hrs has had 6 diarrhea stools. Just restarted his probiotics. He thinks the probiotics have just not \"kicked-in\" yet.  He's unable to return to work today, yet.    Luis Enrique will need a note for work. He'll inform Dr Mata of his progress later today or sometime tomorrow to have the note include all his missed days. First day out was 7/6/21.    He wants the covid serology test done in the future. He'll ask to have this ordered when he comes in, hopefully in the near future, for his annual exam.    Dr. Mata can always message Luis Enrique via [employer].    COVID 19 Nurse Triage Plan/Patient Instructions    Please be aware that novel coronavirus (COVID-19) may be circulating in the community. If you develop symptoms such as fever, cough, or SOB or if you have concerns about the presence of another infection including coronavirus (COVID-19), please contact your health care provider or visit  [email or web address].    Disposition/Instructions    Home care recommended. Follow home care protocol based instructions.    Thank you for taking steps to prevent the spread of this virus.  o Limit your contact with others.  o Wear a simple mask to cover your cough.  o Wash your hands well and often.    Resources    [facility]: About COVID-19: [email or web address]    CDC: What to Do If You're Sick: www.cdc.gov/coronavirus/2019-ncov/about/steps-when-sick.html    CDC: Ending Home Isolation: www.cdc.gov/coronavirus/2019-ncov/hcp/disposition-in-home-patients.html     CDC: Caring for Someone: www.cdc.gov/coronavirus/2019-ncov/if-you-are-sick/care-for-someone.html     [facility]: Interim Guidance for Hospital Discharge to Home: " www.health.[facility].mn.us/diseases/coronavirus/hcp/hospdischarge.pdf    [facility] clinical trials (COVID-19 research studies): clinicalaffairs.[facility]/n-clinical-trials     Below are the COVID-19 hotlines at the [facility] ([facility]). Interpreters are available.   o For health questions: Call 616-079-8809 or 1-594.154.8400 (7 a.m. to 7 p.m.)  o For questions about schools and childcare: Call 625-451-5236 or 1-478.621.3064 (7 a.m. to 7 p.m.)     Reason for Disposition  ? Nursing judgment    Protocols used: NO PROTOCOL AVAILABLE - INFORMATION ONLY-A-[place]    Melita ROTH RN FNA

## 2021-07-13 ENCOUNTER — TELEPHONE (OUTPATIENT)
Dept: FAMILY MEDICINE | Facility: CLINIC | Age: 41
End: 2021-07-13

## 2021-07-13 ENCOUNTER — TRANSFERRED RECORDS (OUTPATIENT)
Dept: HEALTH INFORMATION MANAGEMENT | Facility: CLINIC | Age: 41
End: 2021-07-13

## 2021-07-13 NOTE — TELEPHONE ENCOUNTER
Telephone Encounter by Nya Mata MD at 7/13/2021  5:31 PM     Author: Nya Mata MD Service: -- Author Type: Physician    Filed: 7/13/2021  5:31 PM Encounter Date: 7/12/2021 Status: Signed    : Nya Mata MD (Physician)       Letter was sent

## 2021-07-13 NOTE — TELEPHONE ENCOUNTER
I discussed the risks of -sided carotid endarterectomy including but not limited to death, stroke, bleeding, MI and infection. We reviewed the benefits and alternatives including medical management and stenting. Mr. Rosario was involved in all aspects of decision making and appears to understand. They would like to proceed with the recommended treatment of carotid endarterectomy.

## 2021-07-13 NOTE — TELEPHONE ENCOUNTER
Pt calling needs note to return to work he wanted to go back tomorrow   He was absent from work 07/6-would like to return 07/14  This note can be sent to [employer] please

## 2021-07-13 NOTE — TELEPHONE ENCOUNTER
Patient is calling to ask for a note to return to work effective 7/14  He asked if it can be written to view on my chart

## 2021-07-14 NOTE — TELEPHONE ENCOUNTER
Patient called to request if provider can write another note for work. He stated that this morning, he was having the same symptoms of [employer]. He would like the note to include that he is out today as well, and will return to work tomorrow July 15, [address]. Please send letter via [employer].

## 2021-07-21 ENCOUNTER — TELEPHONE (OUTPATIENT)
Dept: FAMILY MEDICINE | Facility: CLINIC | Age: 41
End: 2021-07-21

## 2021-07-21 NOTE — TELEPHONE ENCOUNTER
Reason for Call:  Other     Detailed comments: pt was given a work slip excusing him from work 7-6 thru 7-13  it needs to include  the 14th of July  he didn't return to work until the 15th July due to his diarrhea flare up   Please update and send to [employer]     Phone Number Patient can be reached at: [phone]    Best Time:anytime  Can we leave a detailed message on this number? YES    Call taken on 7/21/2021 at 8:32 AM by Mariluz Osorio

## 2021-07-21 NOTE — TELEPHONE ENCOUNTER
Pt is asking for the previous work note to include 7/14/21 also, he did not return to work until 7/15/21 due to his diarrhea. Original work note from Dr. Mata was written to be excused from 7/6-7/13, sent to [employer]

## 2021-07-22 NOTE — LETTER
Letter by Melita Mendez RN at      Author: Melita Mendez RN Service: -- Author Type: --    Filed:  Encounter Date: 7/12/2021 Status: (Other)         July 13, 2021     Patient: Luis Enrique Rosario   YOB: 1980   Date of Visit: 7/12/2021       To Whom It May Concern:    It is my medical opinion that Luis Enrique Rosario may return to full duty immediately with no restrictions.    If you have any questions or concerns, please don't hesitate to call.    Sincerely,        Electronically signed by Melita Mendez RN

## 2021-07-26 ENCOUNTER — TELEPHONE (OUTPATIENT)
Dept: FAMILY MEDICINE | Facility: CLINIC | Age: 41
End: 2021-07-26

## 2021-07-29 DIAGNOSIS — F90.1 ATTENTION DEFICIT HYPERACTIVITY DISORDER (ADHD), PREDOMINANTLY HYPERACTIVE TYPE: ICD-10-CM

## 2021-07-29 RX ORDER — DEXTROAMPHETAMINE SACCHARATE, AMPHETAMINE ASPARTATE MONOHYDRATE, DEXTROAMPHETAMINE SULFATE AND AMPHETAMINE SULFATE 5; 5; 5; 5 MG/1; MG/1; MG/1; MG/1
20 CAPSULE, EXTENDED RELEASE ORAL DAILY
Qty: 31 CAPSULE | Refills: 0 | OUTPATIENT
Start: 2021-07-29

## 2021-07-29 RX ORDER — DEXTROAMPHETAMINE SACCHARATE, AMPHETAMINE ASPARTATE, DEXTROAMPHETAMINE SULFATE AND AMPHETAMINE SULFATE 2.5; 2.5; 2.5; 2.5 MG/1; MG/1; MG/1; MG/1
10 TABLET ORAL DAILY
Qty: 30 TABLET | Refills: 0 | OUTPATIENT
Start: 2021-07-29

## 2021-07-29 NOTE — TELEPHONE ENCOUNTER
Reason for Call:  Medication or medication refill:    Do you use a [facility]?  Name of the pharmacy and phone number for the current request:  [facility] [phone]    Name of the medication requested:   adderall 10 mg  adderall 20 mg    Other request: na    Can we leave a detailed message on this number? YES    Phone number patient can be reached at: Home number on file [phone] (home)    Best Time: anytime    Call taken on 7/29/2021 at 11:37 AM by Mariluz Osorio

## 2021-08-10 ENCOUNTER — TELEPHONE (OUTPATIENT)
Dept: FAMILY MEDICINE | Facility: CLINIC | Age: 41
End: 2021-08-10

## 2021-08-10 NOTE — TELEPHONE ENCOUNTER
Left VM for pt regarding message below, if pt calls back please relay message below and schedule telephone visit for patient to discuss things with Dr. Mata. #1

## 2021-08-10 NOTE — TELEPHONE ENCOUNTER
I did try to call him last week, but he did not  the phone, since he does have multiple issues to discuss, we can schedule a quick phone visit to go over them.

## 2021-08-10 NOTE — TELEPHONE ENCOUNTER
Reason for Call:  Other     Detailed comments: patient is calling today for a variety of reasons. Most of all he needs to talk to dr colbert. He has questions and concerns about his health.he has been waiting for a call back and he said noone has called  Today he needs a note to say he cannot wear a mask at work. His work place is very hot and muggy and he said he certainly will get sick if he has to wear a mask.he has many allergies and wearing a mask will make matters worse  That being said, hed like a call back but also a notedfrom dr colbert stating he cannot wear a mask due to health reasons    Phone Number Patient can be reached at: Home number on file [phone] (home)    Best Time: asap please per patient    Can we leave a detailed message on this number? NO    Call taken on 8/10/2021 at 12:07 PM by Marlene Avendano

## 2021-08-11 NOTE — TELEPHONE ENCOUNTER
Left VM for pt regarding message below, if pt calls back please relay message below and schedule Virtual Visit. #2

## 2021-08-12 ENCOUNTER — TELEPHONE (OUTPATIENT)
Dept: FAMILY MEDICINE | Facility: CLINIC | Age: 41
End: 2021-08-12

## 2021-08-12 NOTE — TELEPHONE ENCOUNTER
Reason for Call:  Other Work note    Detailed comments: Patient called to request provider to write a work note for him to excuse him for yesterday and today. He had a upset stomach and could not stop using the bathroom. He would like the note to state return date to work as tomorrow, 08/13/2021.  He cannot return to work without this note. Patient would like to receive this work note via [employer].    Phone Number Patient can be reached at: Home number on file [phone] (home)    Best Time: any    Can we leave a detailed message on this number? YES    Call taken on 8/12/2021 at 1:31 PM by Shaina Scott

## 2021-08-12 NOTE — LETTER
[facility] [address] [place] [address]  Phone: [phone]  Fax: [phone]    August 12, 2021        Luis Enrique Rosario  [address] [place] [address] [address]  [facility] [address]          To whom it may concern:    RE: Luis Enrique Rosario    Patient was not seen and treated at our clinic but did call repoting his symptoms, please excuse him for missing work August 10 and 11,He  will be able to return to work August 13.    Please contact me for questions or concerns.      Sincerely,        Nya Mata MD

## 2021-08-13 ENCOUNTER — TELEPHONE (OUTPATIENT)
Dept: FAMILY MEDICINE | Facility: CLINIC | Age: 41
End: 2021-08-13

## 2021-08-13 PROBLEM — J02.9 SORE THROAT: Status: ACTIVE | Noted: 2021-08-13

## 2021-08-13 NOTE — TELEPHONE ENCOUNTER
Reason for Call:  Other     Detailed comments: received a email stating there was a work slip sent to [employer] he is unable to view it he would like it emailed to him so he can go to work today   Phone Number Patient can be reached at: Home number on file [phone] (home)    Best Time: asap     Can we leave a detailed message on this number? YES    Call taken on 8/13/2021 at 12:13 PM by Mariluz Osorio

## 2021-08-13 NOTE — LETTER
To whom it may concern:     RE: Luis Enrique Rosario     Patient was not seen and treated at our clinic but did call repoting his symptoms, please excuse him for missing work August 10 and 11,He  will be able to return to work August 13.     Please contact me for questions or concerns.        Sincerely,           Nya Mata MD

## 2021-08-13 NOTE — TELEPHONE ENCOUNTER
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

## 2021-08-21 ENCOUNTER — HEALTH MAINTENANCE LETTER (OUTPATIENT)
Age: 41
End: 2021-08-21

## 2021-08-25 ENCOUNTER — VIRTUAL VISIT (OUTPATIENT)
Dept: FAMILY MEDICINE | Facility: CLINIC | Age: 41
End: 2021-08-25
Payer: MEDICARE

## 2021-08-25 DIAGNOSIS — F43.10 PTSD (POST-TRAUMATIC STRESS DISORDER): ICD-10-CM

## 2021-08-25 DIAGNOSIS — K58.0 IRRITABLE BOWEL SYNDROME WITH DIARRHEA: ICD-10-CM

## 2021-08-25 DIAGNOSIS — F41.1 ANXIETY STATE: Primary | ICD-10-CM

## 2021-08-25 PROCEDURE — 99442 PR PHYSICIAN TELEPHONE EVALUATION 11-20 MIN: CPT | Mod: 95 | Performed by: FAMILY MEDICINE

## 2021-08-25 RX ORDER — DEXTROAMPHETAMINE SACCHARATE, AMPHETAMINE ASPARTATE MONOHYDRATE, DEXTROAMPHETAMINE SULFATE AND AMPHETAMINE SULFATE 5; 5; 5; 5 MG/1; MG/1; MG/1; MG/1
20 CAPSULE, EXTENDED RELEASE ORAL EVERY MORNING
COMMUNITY
Start: 2021-06-17 | End: 2021-09-10

## 2021-08-25 RX ORDER — DEXTROAMPHETAMINE SACCHARATE, AMPHETAMINE ASPARTATE, DEXTROAMPHETAMINE SULFATE AND AMPHETAMINE SULFATE 2.5; 2.5; 2.5; 2.5 MG/1; MG/1; MG/1; MG/1
1 TABLET ORAL DAILY
COMMUNITY
Start: 2021-06-24 | End: 2021-09-10

## 2021-08-25 NOTE — LETTER
[facility] [address] [place] [address]  Phone: [phone]  Fax: [phone]    August 25, 2021        Luis Enrique Rosario  [address] [place] [address] [address]  [facility] [place] [facility] [address]          To whom it may concern:    RE: Luis Enrique Rosario    Patient was seen and treated today at our clinic.He does have some well-known digestive issues sometimes affecting his ability to work a regular shift.  When the patient returns to work, please  accommodate and allow him to take timeout if he has flareup.    Please contact me for questions or concerns.      Sincerely,        Allen Mata MD  [facility].  [address] [place] [address], [address]  Ph:[phone]  Fax:[phone]

## 2021-08-25 NOTE — PROGRESS NOTES
Luis Enrique is a 41 year old who is being evaluated via a billable telephone visit.      What phone number would you like to be contacted at? [phone]  How would you like to obtain your AVS? MyChart    Assessment & Plan     Anxiety  Irritable bowel syndrome with diarrhea  PTSD (post-traumatic stress disorder)  Anxiety with history of posttraumatic stress disorder, irritable bowel syndrome with diarrhea, recent exacerbation of IBS symptoms, did miss a few days of work, discussed return to work modalities, patient would like a note.  We also discussed management of IBS, has seen GI in the past, discussed possibly doing another consult.  Anxiety management discussed with relaxation technique, deep breathing, PTSD management discussed, using medical cannabis as needed.  COVID-19 protection discussed, is getting anxiety panic attack with a mass, we discussed option of face shield, social distancing, regular testing, etc.                   No follow-ups on file.    Nya Mata MD  [facility]   Luis Enrique is a 41 year old who presents for the following health issues     HPI   History of posttraumatic stress disorder, irritable bowel syndrome, anxiety, ADHD, recent exacerbation of gastrointestinal syndrome with diarrhea, he did miss a few days of work recently, has seen GI in the past, working on low  FODMAP diet, does not seems to work all the time.He is mentioning allergy to eggs,  Asking for a letter to help accommodated him.  Also having a hard time keeping the mass at work, felt panicky and anxious, stating that he did have typical COVID-19 symptoms in the spring of last year.  Does not want to do the vaccine because he is afraid he might have some symptoms.ADHD has been stable with Adderall.  PTSD controlled with as needed medical cannabis.    Review of Systems   Constitutional, HEENT, cardiovascular, pulmonary, gi and gu systems are negative, except as otherwise noted.       Objective           Vitals:  No vitals were obtained today due to virtual visit.    Physical Exam   healthy, alert and no distress  PSYCH: Alert and oriented times 3; coherent speech, normal   rate and volume, able to articulate logical thoughts, able   to abstract reason, no tangential thoughts, no hallucinations   or delusions  His affect is normal  RESP: No cough, no audible wheezing, able to talk in full sentences  Remainder of exam unable to be completed due to telephone visits        Phone call duration: 18 minutes

## 2021-09-10 DIAGNOSIS — F90.9 ATTENTION DEFICIT HYPERACTIVITY DISORDER (ADHD), UNSPECIFIED ADHD TYPE: Primary | ICD-10-CM

## 2021-09-10 NOTE — TELEPHONE ENCOUNTER
Reason for Call:  Medication or medication refill:    Do you use a [facility]?  Name of the pharmacy and phone number for the current request:  [facility]  [phone]  Name of the medication requested:   adderall xr 20 mg  adderall  10 mg    Other request: no    Can we leave a detailed message on this number? YES    Phone number patient can be reached at: Home number on file [phone] (home)    Best Time: anytime    Call taken on 9/10/2021 at 1:08 PM by Mariluz Osorio

## 2021-09-14 RX ORDER — DEXTROAMPHETAMINE SACCHARATE, AMPHETAMINE ASPARTATE MONOHYDRATE, DEXTROAMPHETAMINE SULFATE AND AMPHETAMINE SULFATE 5; 5; 5; 5 MG/1; MG/1; MG/1; MG/1
20 CAPSULE, EXTENDED RELEASE ORAL EVERY MORNING
Qty: 30 CAPSULE | Refills: 0 | Status: SHIPPED | OUTPATIENT
Start: 2021-09-14 | End: 2021-11-11

## 2021-09-14 RX ORDER — DEXTROAMPHETAMINE SACCHARATE, AMPHETAMINE ASPARTATE, DEXTROAMPHETAMINE SULFATE AND AMPHETAMINE SULFATE 2.5; 2.5; 2.5; 2.5 MG/1; MG/1; MG/1; MG/1
10 TABLET ORAL DAILY
Qty: 30 TABLET | Refills: 0 | Status: SHIPPED | OUTPATIENT
Start: 2021-09-14 | End: 2021-11-11

## 2021-10-08 ENCOUNTER — TELEPHONE (OUTPATIENT)
Dept: FAMILY MEDICINE | Facility: CLINIC | Age: 41
End: 2021-10-08

## 2021-10-08 NOTE — TELEPHONE ENCOUNTER
Patient called to request provider to write a diet letter for him for [facility]. He is currently seeking assistance with food and due to his condition, provider had suggest years ago that he follow a low sofi diet and he has been following that. Please write letter for patient and fax it to his , Kilo at [facility]. Patient will call back with fax number.

## 2021-10-08 NOTE — LETTER
[facility] [address] [place] [address]  Phone: [phone]  Fax: [phone]    October 22, 2021        Luis Enrique Rosario  [address] [place] [address]          To whom it may concern:    RE: Luis Enrique ANA Rosario    Patient is on special diet to help reduce his abdominal pain and discomfort.    Please contact me for questions or concerns.      Sincerely,        Nya Mata MD

## 2021-10-08 NOTE — TELEPHONE ENCOUNTER
Reason for Call:  Other Diet Letter    Detailed comments: Patient called to request provider to write a diet letter for him for [facility]. He is currently seeking assistance with food and due to his condition, provider had suggest years ago that he follow a low sofi diet and he has been following that. Please write letter for patient and fax it to his , Kilo at [facility]. Patient will call back with fax number.    Phone Number Patient can be reached at: Home number on file [phone] (home)    Best Time: any    Can we leave a detailed message on this number? YES    Call taken on 10/8/2021 at 2:56 PM by Shaina Scott

## 2021-10-14 NOTE — TELEPHONE ENCOUNTER
"Attempt to call pt, left voice mail for patient to call clinic back .#1     \" Okay to relay message\"   "

## 2021-10-19 PROBLEM — F32.9 MAJOR DEPRESSION: Status: ACTIVE | Noted: 2021-02-08

## 2021-10-20 NOTE — TELEPHONE ENCOUNTER
"Patient called back again today and said there is no set form for this letter. Dr colbert just needs to use letterhead and state that he is on the \"special\" diet.  It can be faxed to  [facility] at [phone]  "

## 2021-11-09 DIAGNOSIS — F90.9 ATTENTION DEFICIT HYPERACTIVITY DISORDER (ADHD), UNSPECIFIED ADHD TYPE: ICD-10-CM

## 2021-11-09 NOTE — TELEPHONE ENCOUNTER
Patient Luis Enrique Rosario calling and he is needing refills on both his doses of     1.Adderall 10 mg's taken in the afternoon 1x  2  Adderall 20 mg's 1x in morning.     Please refill both doses. Thank you.     He is using a different pharmacy than before. Can you please enter this information and get the above refills to this pharmacy for him? Thank you!    Natalia Kelly    [address] [facility] [place] [facility]     [phone]    Patient is completely out of medication, needs asap.     Please call patient to confirm his refills have been sent to his pharmacy. Thanks.

## 2021-11-11 RX ORDER — DEXTROAMPHETAMINE SACCHARATE, AMPHETAMINE ASPARTATE MONOHYDRATE, DEXTROAMPHETAMINE SULFATE AND AMPHETAMINE SULFATE 5; 5; 5; 5 MG/1; MG/1; MG/1; MG/1
20 CAPSULE, EXTENDED RELEASE ORAL EVERY MORNING
Qty: 30 CAPSULE | Refills: 0 | Status: SHIPPED | OUTPATIENT
Start: 2021-11-11 | End: 2021-12-15

## 2021-11-11 RX ORDER — DEXTROAMPHETAMINE SACCHARATE, AMPHETAMINE ASPARTATE, DEXTROAMPHETAMINE SULFATE AND AMPHETAMINE SULFATE 2.5; 2.5; 2.5; 2.5 MG/1; MG/1; MG/1; MG/1
10 TABLET ORAL DAILY
Qty: 30 TABLET | Refills: 0 | Status: SHIPPED | OUTPATIENT
Start: 2021-11-11 | End: 2021-12-15

## 2021-12-06 ENCOUNTER — NURSE TRIAGE (OUTPATIENT)
Dept: FAMILY MEDICINE | Facility: CLINIC | Age: 41
End: 2021-12-06
Payer: MEDICARE

## 2021-12-06 NOTE — TELEPHONE ENCOUNTER
"Nurse Triage SBAR  Is this a 2nd Level Triage? NO    SITUATION:                                                    (Clearly and briefly define the situation)  Luis Enrique Rosario is a 41 year old male     Patient states he is having \"heavy lungs.\" Started about 4 days ago. States he is \"allergic to everything.\"  States it's worsening.     BACKGROUND:                                                    (Provide clear, relevant background information that relates to the situation)  Current Medication: Zinc, Vitamin C, Vitamin D, Mucinex D, DayQuil  Hx: Allergic rhinitis, Allergies  Last seen: 8/25/21     Raw/sore throat, swollen tonsils, drainage-clear, cough-dry    Hard to breathe, almost wheezing. Winded from going up and down stairs.     Had sinus pain yesterday. Had ear pressure, but no pain.     Denies fevers.    Did self test for COVID and that was negative.     NURSE ASSESSMENT:                                                    (A statement of your professional conclusion)    GO TO ED NOW: You need to be seen in the Emergency Department. Go to the ER at [facility]. Leave now. Drive carefully.    Advised patient to be seen in urgent care or emergency room for \"heavy lungs\" and SOB during times.     Patient states understanding and will try to find someone to come over and get him to  as his car is not running properly.       (See information below for more triage details.)  RECOMMENDATION(S) and PLAN:                                                    (What do you need from this individual?)  Protocol Recommended Disposition: To ED/ for evaluation, another person to drive - yes  Will comply with recommendation: yes      Routing to provider for recommendation on No.    Encourage to return call clinic triage nurse if further questions/concerns that may come up or if symptoms do not improve, worsen, or new symptoms develop.    NOTES: Disposition was determined by the first positive assessment question, " therefore all previous assessment questions were negative.  Guideline used:Cold symptoms/Cough  Triage Protocol    DYLON [facility]  December 6, 2021        Reason for Disposition    Wheezing is present    Additional Information    Negative: Severe difficulty breathing (e.g., struggling for each breath, speaks in single words)    Negative: Very weak (can't stand)    Negative: Sounds like a life-threatening emergency to the triager    Negative: Runny nose is caused by pollen or other allergies    Negative: Cough is the main symptom    Negative: Sore throat is the main symptom    Negative: Patient sounds very sick or weak to the triager    Negative: Fever > 103 F (39.4 C)    Negative: Fever > 101 F (38.3 C) and over 60 years of age    Negative: Fever > 100.0 F (37.8 C) and has diabetes mellitus or a weak immune system (e.g., HIV positive, cancer chemotherapy, organ transplant, splenectomy, chronic steroids)    Negative: Fever > 100.0 F (37.8 C) and bedridden (e.g., nursing home patient, stroke, chronic illness, recovering from surgery)    Negative: Fever present > 3 days (72 hours)    Negative: Fever returns after gone for over 24 hours and symptoms worse or not improved    Negative: Sinus pain (not just congestion) and fever    Negative: Earache    Negative: Bluish (or gray) lips or face    Negative: Severe difficulty breathing (e.g., struggling for each breath, speaks in single words)    Negative: Rapid onset of cough and has hives    Negative: Coughing started suddenly after medicine, an allergic food or bee sting    Negative: Difficulty breathing after exposure to flames, smoke, or fumes    Negative: Sounds like a life-threatening emergency to the triager    Negative: Previous asthma attacks and this feels like asthma attack    Negative: Chest pain present when not coughing    Negative: Difficulty breathing    Negative: Passed out (i.e., fainted, collapsed and was not  responding)    Negative: Patient sounds very sick or weak to the triager    Negative: Coughed up > 1 tablespoon (15 ml) blood (Exception: blood-tinged sputum)    Negative: Fever > 103 F (39.4 C)    Negative: Fever > 101 F (38.3 C) and over 60 years of age    Negative: Fever > 100.0 F (37.8 C) and has diabetes mellitus or a weak immune system (e.g., HIV positive, cancer chemotherapy, organ transplant, splenectomy, chronic steroids)    Negative: Fever > 100.0 F (37.8 C) and bedridden (e.g., nursing home patient, stroke, chronic illness, recovering from surgery)    Negative: Increasing ankle swelling    Protocols used: COUGH-A-OH, COMMON COLD-A-OH

## 2021-12-07 ENCOUNTER — ANCILLARY PROCEDURE (OUTPATIENT)
Dept: GENERAL RADIOLOGY | Facility: CLINIC | Age: 41
End: 2021-12-07
Attending: NURSE PRACTITIONER
Payer: MEDICARE

## 2021-12-07 ENCOUNTER — OFFICE VISIT (OUTPATIENT)
Dept: URGENT CARE | Facility: URGENT CARE | Age: 41
End: 2021-12-07
Payer: MEDICARE

## 2021-12-07 ENCOUNTER — VIRTUAL VISIT (OUTPATIENT)
Dept: FAMILY MEDICINE | Facility: CLINIC | Age: 41
End: 2021-12-07
Payer: MEDICARE

## 2021-12-07 VITALS
TEMPERATURE: 99.3 F | WEIGHT: 165.8 LBS | OXYGEN SATURATION: 98 % | SYSTOLIC BLOOD PRESSURE: 132 MMHG | BODY MASS INDEX: 24 KG/M2 | DIASTOLIC BLOOD PRESSURE: 77 MMHG | HEART RATE: 78 BPM

## 2021-12-07 DIAGNOSIS — R05.9 COUGH: ICD-10-CM

## 2021-12-07 DIAGNOSIS — R53.1 WEAKNESS: ICD-10-CM

## 2021-12-07 DIAGNOSIS — R06.02 SHORTNESS OF BREATH: ICD-10-CM

## 2021-12-07 DIAGNOSIS — R07.9 CHEST PAIN, UNSPECIFIED TYPE: Primary | ICD-10-CM

## 2021-12-07 DIAGNOSIS — R07.89 CHEST HEAVINESS: ICD-10-CM

## 2021-12-07 PROBLEM — J02.9 SORE THROAT: Status: RESOLVED | Noted: 2021-08-13 | Resolved: 2021-12-07

## 2021-12-07 PROCEDURE — 93000 ELECTROCARDIOGRAM COMPLETE: CPT | Performed by: NURSE PRACTITIONER

## 2021-12-07 PROCEDURE — 99443 PR PHYSICIAN TELEPHONE EVALUATION 21-30 MIN: CPT | Mod: 95 | Performed by: NURSE PRACTITIONER

## 2021-12-07 PROCEDURE — 99215 OFFICE O/P EST HI 40 MIN: CPT | Performed by: NURSE PRACTITIONER

## 2021-12-07 PROCEDURE — 71046 X-RAY EXAM CHEST 2 VIEWS: CPT | Performed by: RADIOLOGY

## 2021-12-07 ASSESSMENT — ENCOUNTER SYMPTOMS
NAUSEA: 0
FATIGUE: 1
EYE DISCHARGE: 0
SORE THROAT: 1
ARTHRALGIAS: 1
FEVER: 1
APPETITE CHANGE: 1
DIARRHEA: 0
DIAPHORESIS: 0
HEADACHES: 1
SINUS PRESSURE: 0
COUGH: 1
MYALGIAS: 1
SHORTNESS OF BREATH: 0
WHEEZING: 0
VOMITING: 0
RHINORRHEA: 0

## 2021-12-07 NOTE — PATIENT INSTRUCTIONS
Go to emergency room for further evaluation of chest heaviness, left sided chest pain, shortness of breath with exertion

## 2021-12-07 NOTE — PROGRESS NOTES
Luis Enrique is a 41 year old who is being evaluated via a billable telephone visit.      What phone number would you like to be contacted at? [phone]  How would you like to obtain your AVS? Haleyharnayeli    Assessment & Plan     Chest pain, unspecified type  Due to current symptoms would recommend that be evaluated in ED  Plans to go today    Shortness of breath  Due to current symptoms would recommend that be evaluated in ED  Plans to go today      28 minutes spent on the date of the encounter doing chart review, history and exam, documentation and further activities per the note      No follow-ups on file.    DAYTON Horta CNP  [facility] [place] is a 41 year old who presents for the following health issues     HPI     Acute Illness  Acute illness concerns: cough, left sided chest pain radiating to left arm and shoulder  Onset/Duration: 1 week  Symptoms:  Fever: no  Chills/Sweats: no  Headache (location?): YES  Sinus Pressure: no  Conjunctivitis:  no  Ear Pain: no  Rhinorrhea: no  Congestion: no  Sore Throat: YES-pain and swelling in throat  Cough: YES-dry cough  Wheeze: no  Decreased Appetite: YES  Nausea: no  Vomiting: no  Diarrhea: no  Dysuria/Freq.: no  Dysuria or Hematuria: no  Fatigue/Achiness: YES- body aches and fatigue  Sick/Strep Exposure: no known exposure to illness  Therapies tried and outcome: Mucinex    Review of Systems   Constitutional: Positive for appetite change (decreased), fatigue and fever. Negative for diaphoresis.   HENT: Positive for sore throat. Negative for congestion, ear pain, rhinorrhea and sinus pressure.    Eyes: Negative for discharge.   Respiratory: Positive for cough. Negative for shortness of breath and wheezing.    Cardiovascular: Negative for chest pain.   Gastrointestinal: Negative for diarrhea, nausea and vomiting.   Musculoskeletal: Positive for arthralgias and myalgias.   Neurological: Positive for headaches.      Has not been  feeling well for the last week. Feels like could be getting pneumonia. Did feel like was getting better, now is getting worse again. Headaches. Has been taking sudafed, mucinex, nyquil. Lungs feel heavy if goes up and down stairs, is SOB. Left side of chest feels more heavy. Drainage is clear. Pain is near armpit, into shoulder, into left arm. Will be aching. No sharp, shooting pain. Did at home test for COVID that is negative. This test was 2 days.         Objective           Vitals:  No vitals were obtained today due to virtual visit.    Physical Exam   healthy, alert and no distress  PSYCH: Alert and oriented times 3; coherent speech, normal   rate and volume, able to articulate logical thoughts, able   to abstract reason, no tangential thoughts, no hallucinations   or delusions  His affect is normal  RESP: No cough, no audible wheezing, able to talk in full sentences  Remainder of exam unable to be completed due to telephone visits            Phone call duration: 22 minutes

## 2021-12-07 NOTE — PROGRESS NOTES
Assessment & Plan     Chest pain, unspecified type  - EKG 12-lead complete w/read - Clinics    Cough  - XR Chest 2 Views    Shortness of breath  - XR Chest 2 Views    Chest heaviness    Weakness       Recommend further evaluation in emergency room for further evaluation of left sided chest pain, chest heaviness, shortness of breath, weakness to rule out PE or heart attack. Patient is agreeable and declines ambulance. He is discharged in stable condition.         Vicenta Chau NP  [facility]     Luis Enrique is a 41 year old male who presents to clinic today for the following health issues:  Chief Complaint   Patient presents with     Chest Pain     Going on for 1 wk. Started with HA went away, dry cough, heavy lungs.     Patient presents for evaluation of chest pain for the past week which has been worsening. Associated symptoms: dry cough, chest heaviness. He had a headache which resolved. He has left sided chest pain radiating to his left shoulder. Denies fever, chills, headache, nausea. No history of diabetes or heart disease, though he has a lot of heart disease in his family. He has not been vaccinated for COVID. He had COVID and influenza in February. He has tried Mucinex which hasn't helped. He had a virtual visit today and was advised to go to emergency room for further evaluation of his concerning symptoms.     Problem list, Medication list, Allergies, and Medical history reviewed in EPIC.    ROS:  Review of systems negative except for noted above        Objective    /77   Pulse 78   Temp 99.3  F (37.4  C) (Tympanic)   Wt 75.2 kg (165 lb 12.8 oz)   SpO2 98%   BMI 24.00 kg/m    Physical Exam  Constitutional:       General: He is not in acute distress.     Appearance: He is not toxic-appearing or diaphoretic.   HENT:      Head: Normocephalic and atraumatic.      Right Ear: Tympanic membrane, ear canal and external ear normal.      Left Ear: Tympanic  "membrane, ear canal and external ear normal.      Nose: Nose normal.      Mouth/Throat:      Mouth: Mucous membranes are moist.      Pharynx: Oropharynx is clear. No oropharyngeal exudate or posterior oropharyngeal erythema.   Eyes:      Extraocular Movements: Extraocular movements intact.      Conjunctiva/sclera: Conjunctivae normal.      Pupils: Pupils are equal, round, and reactive to light.   Cardiovascular:      Rate and Rhythm: Normal rate and regular rhythm.      Heart sounds: Normal heart sounds.   Pulmonary:      Effort: Pulmonary effort is normal. No respiratory distress.      Breath sounds: Normal breath sounds. No wheezing, rhonchi or rales.      Comments: Able to speak in complete sentences  Lymphadenopathy:      Cervical: No cervical adenopathy.   Skin:     General: Skin is warm and dry.   Neurological:      General: No focal deficit present.      Mental Status: He is alert and oriented to person, place, and time.      Cranial Nerves: No cranial nerve deficit.      Sensory: No sensory deficit.      Motor: No weakness.      Gait: Gait normal.          EKG completed showing \"undetermined rhythm\" which appears normal sinus rate 73 without obvious ST elevation or depression      X-ray chest was performed   Radiologist impression:   Results for orders placed or performed in visit on 12/07/21   XR Chest 2 Views     Status: None    Narrative    EXAM: XR CHEST 2 VW  LOCATION: [facility]  DATE/TIME: 12/7/2021 5:03 PM    INDICATION: Cough and shortness of breath.  COMPARISON: None.      Impression    IMPRESSION: Negative chest. Lungs are clear. Normal heart size.             "

## 2021-12-07 NOTE — PATIENT INSTRUCTIONS
Your symptoms are very concerning for a more serious condition.  I would recommend that you be evaluated today in the emergency department.  Please go to the location closest to you.

## 2021-12-14 ENCOUNTER — TELEPHONE (OUTPATIENT)
Dept: FAMILY MEDICINE | Facility: CLINIC | Age: 41
End: 2021-12-14

## 2021-12-14 DIAGNOSIS — F90.9 ATTENTION DEFICIT HYPERACTIVITY DISORDER (ADHD), UNSPECIFIED ADHD TYPE: ICD-10-CM

## 2021-12-14 NOTE — TELEPHONE ENCOUNTER
"Have you filled out this form? I do not see anything in media.  Medication refill requested. Please authorize medication if appropriate.             Reason for Call:  Form, our goal is to have forms completed with 72 hours, however, some forms may require a visit or additional information.    Type of letter, form or note:  Assistance    Who is the form from?: N/A    Where did the form come from: form was faxed in    What clinic location was the form placed at?: [facility]    Where the form was placed: N?A    What number is listed as a contact on the form?: N/A       Additional comments: Per pt, Form was faxed in 1 month ago \"Professional Statement of Need\" it is a enrollment form for assistance. Pt is calling on the status of the ppwk. Please call him back with an update.    Pt would also like a refill on both of his Adderral Rx. Please send to [facility] [address] in [place].    Call taken on 12/14/2021 at 4:54 PM by Ara Chauhan        "

## 2021-12-15 RX ORDER — DEXTROAMPHETAMINE SACCHARATE, AMPHETAMINE ASPARTATE MONOHYDRATE, DEXTROAMPHETAMINE SULFATE AND AMPHETAMINE SULFATE 5; 5; 5; 5 MG/1; MG/1; MG/1; MG/1
20 CAPSULE, EXTENDED RELEASE ORAL EVERY MORNING
Qty: 30 CAPSULE | Refills: 0 | Status: SHIPPED | OUTPATIENT
Start: 2021-12-15 | End: 2021-12-22

## 2021-12-15 RX ORDER — DEXTROAMPHETAMINE SACCHARATE, AMPHETAMINE ASPARTATE, DEXTROAMPHETAMINE SULFATE AND AMPHETAMINE SULFATE 2.5; 2.5; 2.5; 2.5 MG/1; MG/1; MG/1; MG/1
10 TABLET ORAL DAILY
Qty: 30 TABLET | Refills: 0 | Status: SHIPPED | OUTPATIENT
Start: 2021-12-15 | End: 2021-12-22

## 2021-12-17 NOTE — TELEPHONE ENCOUNTER
Left VM for pt regarding message below, if pt calls back please relay message below and have pt fax back form #1

## 2021-12-17 NOTE — TELEPHONE ENCOUNTER
Nya Mata MD [facility] Team Pool 2 days ago     GM    I do not remember feeling, he may need to fax it back.

## 2021-12-17 NOTE — TELEPHONE ENCOUNTER
Patient calls back to follow-up on paperwork. Writer do not see a message to relay. Writer message Siobhan at [facility]. Siobhan message back to have patient refax as Dr. Mata does not have form or patient bring in the form. Best fax is [phone]. She will update the encounter to show his message.     Writer relay message to patient. Patient was not very happy but states he will refax it to [phone].

## 2021-12-22 ENCOUNTER — TELEPHONE (OUTPATIENT)
Dept: FAMILY MEDICINE | Facility: CLINIC | Age: 41
End: 2021-12-22
Payer: MEDICARE

## 2021-12-22 DIAGNOSIS — F90.9 ATTENTION DEFICIT HYPERACTIVITY DISORDER (ADHD), UNSPECIFIED ADHD TYPE: ICD-10-CM

## 2021-12-22 RX ORDER — DEXTROAMPHETAMINE SACCHARATE, AMPHETAMINE ASPARTATE, DEXTROAMPHETAMINE SULFATE AND AMPHETAMINE SULFATE 2.5; 2.5; 2.5; 2.5 MG/1; MG/1; MG/1; MG/1
10 TABLET ORAL DAILY
Qty: 30 TABLET | Refills: 0 | Status: SHIPPED | OUTPATIENT
Start: 2021-12-22 | End: 2021-12-29

## 2021-12-22 RX ORDER — DEXTROAMPHETAMINE SACCHARATE, AMPHETAMINE ASPARTATE MONOHYDRATE, DEXTROAMPHETAMINE SULFATE AND AMPHETAMINE SULFATE 5; 5; 5; 5 MG/1; MG/1; MG/1; MG/1
20 CAPSULE, EXTENDED RELEASE ORAL EVERY MORNING
Qty: 30 CAPSULE | Refills: 0 | Status: SHIPPED | OUTPATIENT
Start: 2021-12-22 | End: 2021-12-29

## 2021-12-22 NOTE — TELEPHONE ENCOUNTER
Reason for Call:  Medication     Do you use a [facility]?  No    Name of the pharmacy and phone number for the current request:  [id number] - [place], [place] - [address]  [phone]    Name of the medication requested:   -amphetamine-dextroamphetamine (ADDERALL XR) 20 MG 24 hr capsule  -amphetamine-dextroamphetamine (ADDERALL) 10 MG tablet    Other request: Pt hasn't picked up meds from last week yet. He's having car trouble and requesting meds to be sent to a different location that's closer.     Can we leave a detailed message on this number? YES    Phone number patient can be reached at: Cell number on file:    Telephone Information:   Mobile [phone]       Best Time: anytime    Call taken on 12/22/2021 at 12:43 PM by Ludmila Reyes

## 2021-12-22 NOTE — TELEPHONE ENCOUNTER
Patient is calling back today to say he has to have the adderall called in to the [facility] [address], he changed pharmacies. I asked if he could just get it Rx sent already and he said he cannot      Has not seen any form

## 2021-12-29 RX ORDER — DEXTROAMPHETAMINE SACCHARATE, AMPHETAMINE ASPARTATE, DEXTROAMPHETAMINE SULFATE AND AMPHETAMINE SULFATE 2.5; 2.5; 2.5; 2.5 MG/1; MG/1; MG/1; MG/1
10 TABLET ORAL DAILY
Qty: 30 TABLET | Refills: 0 | Status: SHIPPED | OUTPATIENT
Start: 2021-12-29 | End: 2022-02-03

## 2021-12-29 RX ORDER — DEXTROAMPHETAMINE SACCHARATE, AMPHETAMINE ASPARTATE MONOHYDRATE, DEXTROAMPHETAMINE SULFATE AND AMPHETAMINE SULFATE 5; 5; 5; 5 MG/1; MG/1; MG/1; MG/1
20 CAPSULE, EXTENDED RELEASE ORAL EVERY MORNING
Qty: 30 CAPSULE | Refills: 0 | Status: SHIPPED | OUTPATIENT
Start: 2021-12-29 | End: 2022-02-03

## 2021-12-29 NOTE — TELEPHONE ENCOUNTER
"Attempt to call pt, left voice mail for patient to call clinic back .#1     \" Okay to relay message\"      Trying to follow up paper work haven't seen anything yet . Please have patient fax to [phone]  "

## 2022-02-03 ENCOUNTER — TELEPHONE (OUTPATIENT)
Dept: FAMILY MEDICINE | Facility: CLINIC | Age: 42
End: 2022-02-03
Payer: MEDICARE

## 2022-02-03 DIAGNOSIS — F90.9 ATTENTION DEFICIT HYPERACTIVITY DISORDER (ADHD), UNSPECIFIED ADHD TYPE: ICD-10-CM

## 2022-02-03 RX ORDER — DEXTROAMPHETAMINE SACCHARATE, AMPHETAMINE ASPARTATE, DEXTROAMPHETAMINE SULFATE AND AMPHETAMINE SULFATE 2.5; 2.5; 2.5; 2.5 MG/1; MG/1; MG/1; MG/1
10 TABLET ORAL DAILY
Qty: 30 TABLET | Refills: 0 | Status: SHIPPED | OUTPATIENT
Start: 2022-02-03 | End: 2022-03-17

## 2022-02-03 RX ORDER — DEXTROAMPHETAMINE SACCHARATE, AMPHETAMINE ASPARTATE MONOHYDRATE, DEXTROAMPHETAMINE SULFATE AND AMPHETAMINE SULFATE 5; 5; 5; 5 MG/1; MG/1; MG/1; MG/1
20 CAPSULE, EXTENDED RELEASE ORAL EVERY MORNING
Qty: 30 CAPSULE | Refills: 0 | Status: SHIPPED | OUTPATIENT
Start: 2022-02-03 | End: 2022-03-17

## 2022-02-03 NOTE — TELEPHONE ENCOUNTER
Reason for Call:  Medication or medication refill:    Do you use a [facility]?  Name of the pharmacy and phone number for the current request:  [facility] pharmacy on file    Name of the medication requested: amphetamine-dextroamphetamine (ADDERALL XR) 20 MG 24 hr capsule, amphetamine-dextroamphetamine (ADDERALL) 10 MG tablet    Other request: Patient called to request a refill of these medications. Please send refill to pharmacy on file.    Can we leave a detailed message on this number? YES    Phone number patient can be reached at: Home number on file [phone] (home)    Best Time: any    Call taken on 2/3/2022 at 12:25 PM by Shaina Scott

## 2022-02-17 ENCOUNTER — VIRTUAL VISIT (OUTPATIENT)
Dept: FAMILY MEDICINE | Facility: CLINIC | Age: 42
End: 2022-02-17
Payer: MEDICARE

## 2022-02-17 DIAGNOSIS — F98.8 ATTENTION DEFICIT DISORDER, UNSPECIFIED HYPERACTIVITY PRESENCE: ICD-10-CM

## 2022-02-17 DIAGNOSIS — K58.2 IRRITABLE BOWEL SYNDROME WITH BOTH CONSTIPATION AND DIARRHEA: Primary | ICD-10-CM

## 2022-02-17 DIAGNOSIS — F32.0 MILD MAJOR DEPRESSION (H): ICD-10-CM

## 2022-02-17 PROCEDURE — 96127 BRIEF EMOTIONAL/BEHAV ASSMT: CPT | Mod: 95 | Performed by: FAMILY MEDICINE

## 2022-02-17 PROCEDURE — 99442 PR PHYSICIAN TELEPHONE EVALUATION 11-20 MIN: CPT | Mod: 95 | Performed by: FAMILY MEDICINE

## 2022-02-17 RX ORDER — LATANOPROST 50 UG/ML
SOLUTION/ DROPS OPHTHALMIC
COMMUNITY
Start: 2021-05-11 | End: 2022-07-13

## 2022-02-17 ASSESSMENT — PATIENT HEALTH QUESTIONNAIRE - PHQ9: SUM OF ALL RESPONSES TO PHQ QUESTIONS 1-9: 14

## 2022-02-17 NOTE — PROGRESS NOTES
Luis Enrique is a 41 year old who is being evaluated via a billable telephone visit.      What phone number would you like to be contacted at? [phone]  How would you like to obtain your AVS? MyChart    Assessment & Plan     Irritable bowel syndrome with both constipation and diarrhea    Mild major depression (H)    Attention deficit disorder, unspecified hyperactivity presence    Does have multiple medical issues included depression, anxiety PTSD ADHD, seeing a therapist on a psychiatrist in the past, currently taking Adderall for ADHD, seems to be tolerating well, dose was decreased recently.  He does have IBS with diarrhea and constipation, interfering with his quality of life, currently unable to hold a steady job, currently on Social Security, will need a student loan disability form completed.  Patient will mail that to us.    Review of external notes as documented elsewhere in note  14 minutes spent on the date of the encounter doing chart review, review of outside records, review of test results, interpretation of tests, patient visit and documentation            No follow-ups on file.    Nya Mata MD  [facility]   Luis Enrique is a 41 year old who presents for the following health issues      HPI   Does have irritable bowel syndrome-constipation sometimes diarrhea.  Has seen GI in the past, work-up was unremarkable.  Currently doing a low FODMAP diet with sometimes good control of his symptoms.  But has had some intermittent flareup.    ADHD has been stable with medication.  PTSD depression has been stable has seen psychiatrist in the past.  He does have a disability form to be completed, he will mail that to us, this is regarding his student loan.Currently unable to have a steady job, unable to pay student alone.  He is nonsuicidal.      Review of Systems   Constitutional, HEENT, cardiovascular, pulmonary, GI, , musculoskeletal, neuro, skin, endocrine and psych  systems are negative, except as otherwise noted.      Objective           Vitals:  No vitals were obtained today due to virtual visit.    Physical Exam   healthy, alert and no distress  PSYCH: Alert and oriented times 3; coherent speech, normal   rate and volume, able to articulate logical thoughts, able   to abstract reason, no tangential thoughts, no hallucinations   or delusions  His affect is normal  RESP: No cough, no audible wheezing, able to talk in full sentences  Remainder of exam unable to be completed due to telephone visits                Phone call duration: 12 minutes

## 2022-02-24 ENCOUNTER — TELEPHONE (OUTPATIENT)
Dept: FAMILY MEDICINE | Facility: CLINIC | Age: 42
End: 2022-02-24
Payer: MEDICARE

## 2022-02-24 NOTE — TELEPHONE ENCOUNTER
Reason for Call:  Other     Detailed comments: calling to see if fax was received paperwork to be completed  regarding information from his last phone visit    Phone Number Patient can be reached at: Home number on file [phone] (home)    Best Time: anytime    Can we leave a detailed message on this number? YES    Call taken on 2/24/2022 at 3:25 PM by Mariluz Osorio

## 2022-02-25 NOTE — TELEPHONE ENCOUNTER
Patient called back, did provider receive these forms? Patient would like provider to fill out this form. If provider has any questions, please call patient.

## 2022-02-28 NOTE — TELEPHONE ENCOUNTER
Form was received, completed and placed in my out box for pickup.  (Keep a copy in file)  thanks

## 2022-03-02 ENCOUNTER — NURSE TRIAGE (OUTPATIENT)
Dept: NURSING | Facility: CLINIC | Age: 42
End: 2022-03-02
Payer: MEDICARE

## 2022-03-02 NOTE — TELEPHONE ENCOUNTER
Patient calling back asking if this letter can be sent to him.    Patient is calling today to ask for the government paperwork dr colbert completed to be mailed to a different address. He said someone is stealing his mail. He also said his brother isnt impersonating him and hes done this b4  Mail to [address]  [place] [address]      Luis Enrique is calling and is needing to give correct address for his mail.  Luis Enrique states that a letter was mailed from clinic and sent to a wrong address.  Patient is calling today to speak with [facility] direct to update his mailing address and to see if there is a copy in his chart of what was mailed.  Transferred caller through to [facility] direct.    COVID 19 Nurse Triage Plan/Patient Instructions    Please be aware that novel coronavirus (COVID-19) may be circulating in the community. If you develop symptoms such as fever, cough, or SOB or if you have concerns about the presence of another infection including coronavirus (COVID-19), please contact your health care provider or visit [email or web address].     Disposition/Instructions    Home care recommended. Follow home care protocol based instructions.    Thank you for taking steps to prevent the spread of this virus.  o Limit your contact with others.  o Wear a simple mask to cover your cough.  o Wash your hands well and often.    Resources    [facility]: About COVID-19: [email or web address]    CDC: What to Do If You're Sick: www.cdc.gov/coronavirus/2019-ncov/about/steps-when-sick.html    CDC: Ending Home Isolation: www.cdc.gov/coronavirus/2019-ncov/hcp/disposition-in-home-patients.html     CDC: Caring for Someone: www.cdc.gov/coronavirus/2019-ncov/if-you-are-sick/care-for-someone.html     [facility]: Interim Guidance for Hospital Discharge to Home: www.health.[facility].[place]/diseases/coronavirus/hcp/hospdischarge.pdf    [facility] clinical trials (COVID-19 research studies):  clinicalaffairs.[facility]-clinical-trials     Below are the COVID-19 hotlines at the [place] Department of Health ([facility]). Interpreters are available.   o For health questions: Call 313-210-8707 or 1-114.665.1040 (7 a.m. to 7 p.m.)  o For questions about schools and childcare: Call 964-468-8955 or 1-309.779.7606 (7 a.m. to 7 p.m.)

## 2022-03-02 NOTE — TELEPHONE ENCOUNTER
RN obtained a copy from pt's fold file from BYRON Gaston. RN called and spoke to pt regarding Nurse Triage note below.     Per pt, someone is pretending to be pt effectively. Pt suspect it is his brother is trying to steal his mails and identity. Pt would like mail sent to a different address for now. Pt declines address to be changed on EHR.    Address to be mail:  [address] [place] [address]    Pt unable to  mail in-person as he does not have a car right now. RN will mail requested paperwork to address above.    Closing encounter.    ALEKSANDRA LawsN, RN   [facility]

## 2022-03-03 NOTE — TELEPHONE ENCOUNTER
Forms were already mailed to the requested mailing address yesterday.    Pt verified twice that the address below is correct.  [address] [place] [address]    Closing encounter.    ALEKSANDRA LawsN, RN   [facility]

## 2022-03-16 ENCOUNTER — TELEPHONE (OUTPATIENT)
Dept: FAMILY MEDICINE | Facility: CLINIC | Age: 42
End: 2022-03-16
Payer: MEDICARE

## 2022-03-16 DIAGNOSIS — F90.9 ATTENTION DEFICIT HYPERACTIVITY DISORDER (ADHD), UNSPECIFIED ADHD TYPE: ICD-10-CM

## 2022-03-17 RX ORDER — DEXTROAMPHETAMINE SACCHARATE, AMPHETAMINE ASPARTATE, DEXTROAMPHETAMINE SULFATE AND AMPHETAMINE SULFATE 2.5; 2.5; 2.5; 2.5 MG/1; MG/1; MG/1; MG/1
10 TABLET ORAL DAILY
Qty: 30 TABLET | Refills: 0 | Status: SHIPPED | OUTPATIENT
Start: 2022-03-17 | End: 2022-05-05

## 2022-03-17 RX ORDER — DEXTROAMPHETAMINE SACCHARATE, AMPHETAMINE ASPARTATE MONOHYDRATE, DEXTROAMPHETAMINE SULFATE AND AMPHETAMINE SULFATE 5; 5; 5; 5 MG/1; MG/1; MG/1; MG/1
20 CAPSULE, EXTENDED RELEASE ORAL EVERY MORNING
Qty: 30 CAPSULE | Refills: 0 | Status: SHIPPED | OUTPATIENT
Start: 2022-03-17 | End: 2022-05-05

## 2022-05-05 ENCOUNTER — TELEPHONE (OUTPATIENT)
Dept: FAMILY MEDICINE | Facility: CLINIC | Age: 42
End: 2022-05-05
Payer: MEDICARE

## 2022-05-05 DIAGNOSIS — F90.9 ATTENTION DEFICIT HYPERACTIVITY DISORDER (ADHD), UNSPECIFIED ADHD TYPE: ICD-10-CM

## 2022-05-05 RX ORDER — DEXTROAMPHETAMINE SACCHARATE, AMPHETAMINE ASPARTATE MONOHYDRATE, DEXTROAMPHETAMINE SULFATE AND AMPHETAMINE SULFATE 5; 5; 5; 5 MG/1; MG/1; MG/1; MG/1
20 CAPSULE, EXTENDED RELEASE ORAL EVERY MORNING
Qty: 30 CAPSULE | Refills: 0 | Status: SHIPPED | OUTPATIENT
Start: 2022-05-05 | End: 2022-06-20

## 2022-05-05 RX ORDER — DEXTROAMPHETAMINE SACCHARATE, AMPHETAMINE ASPARTATE, DEXTROAMPHETAMINE SULFATE AND AMPHETAMINE SULFATE 2.5; 2.5; 2.5; 2.5 MG/1; MG/1; MG/1; MG/1
10 TABLET ORAL DAILY
Qty: 30 TABLET | Refills: 0 | Status: SHIPPED | OUTPATIENT
Start: 2022-05-05 | End: 2022-06-20

## 2022-05-05 NOTE — TELEPHONE ENCOUNTER
Reason for Call:  Medication or medication refill:    Do you use a [facility]?  Name of the pharmacy and phone number for the current request:  [facility]     Name of the medication requested:   amphetamine-dextroamphetamine (ADDERALL XR) 20 MG 24 hr capsule  amphetamine-dextroamphetamine (ADDERALL) 10 MG tablet      Other request: Pt say he is out of meds and requesting medication for these 2 rx. Please look into request and order med if applicable. Thank you.     Can we leave a detailed message on this number? YES    Phone number patient can be reached at: Home number on file [phone] (home)    Best Time: anytime     Call taken on 5/5/2022 at 2:00 PM by Eugenia Reeves

## 2022-06-08 RX ORDER — DEXTROAMPHETAMINE SACCHARATE, AMPHETAMINE ASPARTATE MONOHYDRATE, DEXTROAMPHETAMINE SULFATE AND AMPHETAMINE SULFATE 5; 5; 5; 5 MG/1; MG/1; MG/1; MG/1
20 CAPSULE, EXTENDED RELEASE ORAL EVERY MORNING
Qty: 30 CAPSULE | Refills: 0 | Status: CANCELLED | OUTPATIENT
Start: 2022-06-08

## 2022-06-08 NOTE — PROGRESS NOTES
Luis Enrique is a 42 year old who is being evaluated via a billable telephone visit.      What phone number would you like to be contacted at? [phone]  How would you like to obtain your AVS? [facility]    Assessment & Plan     1. UTI symptoms    2. Flank pain        1,2) He's coming in this morning to leave a UA/UC. Will start Cipro 500mg BID x10 days. Possible pyelo. If no continued improvements over the weekend we'll do an abd/pelv CT for further evaluation. Supportive therapy also discussed. Follow up if symptoms fail to improve or worsen.       Ordering of each unique test  Prescription drug management         Return in about 4 days (around 6/13/2022), or if symptoms worsen or fail to improve.    NATHANIEL Judge [facility] BEST Dudley is a 42 year old who presents for the following health issues     HPI     Genitourinary - Male  Onset/Duration: a week  Description:   Dysuria (painful urination): YES discomfort  Hematuria (blood in urine): no  Frequency: YES and urgency  Waking at night to urinate: YES, getting up 4-5x per night  Hesitancy (delay in urine): YES, improved  Retention (unable to empty): no  Decrease in urinary flow: no  Incontinence: no  Progression of Symptoms:  same  Accompanying Signs & Symptoms:  Fever: had 1-2 days  Back/Flank pain: YES tight and sore  Urethral discharge: no  Testicle lumps/masses/pain: no  Nausea and/or vomiting: YES- had nausea  Abdominal pain: YES- had but right now its better  History:   History of frequent UTI s: no  History of kidney stones: no  History of hernias: no  Personal or Family history of Prostate problems: no  Sexually active: no  Precipitating or alleviating factors: unknown  Therapies tried and outcome: increase fluid intake, cranberry pills, zinc vitamin c and quercetin. Helping with the symptoms of feverish but the discomfort while urinating is still there     Overall, all sxs are improving        Review of  Systems   Constitutional, gi and gu systems are negative, except as otherwise noted.      Objective           Vitals:  No vitals were obtained today due to virtual visit.    Physical Exam   healthy, alert and no distress  PSYCH: Alert and oriented times 3; coherent speech, normal   rate and volume, able to articulate logical thoughts, able   to abstract reason, no tangential thoughts, no hallucinations   or delusions  His affect is normal and pleasant  RESP: No cough, no audible wheezing, able to talk in full sentences  Remainder of exam unable to be completed due to telephone visits          Phone call duration: 12 minutes

## 2022-06-09 ENCOUNTER — LAB (OUTPATIENT)
Dept: LAB | Facility: CLINIC | Age: 42
End: 2022-06-09

## 2022-06-09 ENCOUNTER — VIRTUAL VISIT (OUTPATIENT)
Dept: FAMILY MEDICINE | Facility: CLINIC | Age: 42
End: 2022-06-09
Payer: MEDICARE

## 2022-06-09 DIAGNOSIS — R39.9 UTI SYMPTOMS: ICD-10-CM

## 2022-06-09 DIAGNOSIS — R10.9 FLANK PAIN: ICD-10-CM

## 2022-06-09 DIAGNOSIS — R39.9 UTI SYMPTOMS: Primary | ICD-10-CM

## 2022-06-09 LAB
ALBUMIN UR-MCNC: NEGATIVE MG/DL
APPEARANCE UR: CLEAR
BACTERIA #/AREA URNS HPF: ABNORMAL /HPF
BILIRUB UR QL STRIP: NEGATIVE
COLOR UR AUTO: YELLOW
GLUCOSE UR STRIP-MCNC: NEGATIVE MG/DL
HGB UR QL STRIP: ABNORMAL
KETONES UR STRIP-MCNC: NEGATIVE MG/DL
LEUKOCYTE ESTERASE UR QL STRIP: ABNORMAL
NITRATE UR QL: NEGATIVE
PH UR STRIP: 5.5 [PH] (ref 5–7)
RBC #/AREA URNS AUTO: ABNORMAL /HPF
SP GR UR STRIP: 1.01 (ref 1–1.03)
SQUAMOUS #/AREA URNS AUTO: ABNORMAL /LPF
UROBILINOGEN UR STRIP-ACNC: 0.2 E.U./DL
WBC #/AREA URNS AUTO: ABNORMAL /HPF

## 2022-06-09 PROCEDURE — 87086 URINE CULTURE/COLONY COUNT: CPT

## 2022-06-09 PROCEDURE — 87088 URINE BACTERIA CULTURE: CPT

## 2022-06-09 PROCEDURE — 99442 PR PHYSICIAN TELEPHONE EVALUATION 11-20 MIN: CPT | Mod: 95 | Performed by: PHYSICIAN ASSISTANT

## 2022-06-09 PROCEDURE — 81001 URINALYSIS AUTO W/SCOPE: CPT

## 2022-06-09 RX ORDER — CIPROFLOXACIN 500 MG/1
500 TABLET, FILM COATED ORAL 2 TIMES DAILY
Qty: 20 TABLET | Refills: 0 | Status: SHIPPED | OUTPATIENT
Start: 2022-06-09 | End: 2022-06-19

## 2022-06-11 LAB — BACTERIA UR CULT: ABNORMAL

## 2022-06-14 ENCOUNTER — TELEPHONE (OUTPATIENT)
Dept: FAMILY MEDICINE | Facility: CLINIC | Age: 42
End: 2022-06-14
Payer: MEDICARE

## 2022-06-14 DIAGNOSIS — F90.9 ATTENTION DEFICIT HYPERACTIVITY DISORDER (ADHD), UNSPECIFIED ADHD TYPE: ICD-10-CM

## 2022-06-14 NOTE — LETTER
[facility]  [address] [place] [address]  Phone: [phone]    June 16, 2022        Luis Enrique Rosario  [address] [address]          To whom it may concern:    RE: Luis Enrique Rosario    Patient is on a low  FODMAP diet,  This diet is designed to help people with irritable bowel syndrome (IBS) and/or small intestinal bacterial overgrowth (SIBO).    Please contact me for questions or concerns.      Sincerely,        Allen Mata MD  [facility].  [address] [place] [address], [address]  Ph:[phone]  Fax:[phone]

## 2022-06-14 NOTE — TELEPHONE ENCOUNTER
Reason for Call:  Other Documentation for his diet    Detailed comments: Patient called and is wondering if he can be emailed or mailed out specific details of his diet if any questions he said he can be contacted please advise thank you    Phone Number Patient can be reached at: Home number on file [phone] (home)    Best Time: anyitme    Can we leave a detailed message on this number? YES    Call taken on 6/14/2022 at 1:52 PM by Nimco Walker

## 2022-06-14 NOTE — TELEPHONE ENCOUNTER
Routing refill request to provider for review/approval because:  Drug not on the [facility] refill protocol. Patient would like Orly Wilkerson to Be his pcp  Last Written Prescription Date:  5/5/22  Last Fill Quantity: 30,  # refills: 0   Last office visit: 4/28/2022 and  6/9/22. Last filled by his previous  Future Office Visit:    Inessa Cowan RN  [facility]

## 2022-06-14 NOTE — TELEPHONE ENCOUNTER
Patient is on a FODMOP diet.  Which is an elimination diet.  Gluten free  Tree nut free  No raw vegetables,   No raw fruit  Dairy intolerant.  He is asking if we have information on this to better explain to his insurance coverage and he will get assistance. He would like this mailed to him    Inessa Cowan RN  [facility]

## 2022-06-17 NOTE — TELEPHONE ENCOUNTER
Chest x-ray order was placed, she can schedule an appointment, but if symptoms get worse she needs to go to the ER.

## 2022-06-19 ENCOUNTER — NURSE TRIAGE (OUTPATIENT)
Dept: NURSING | Facility: CLINIC | Age: 42
End: 2022-06-19
Payer: MEDICARE

## 2022-06-19 NOTE — TELEPHONE ENCOUNTER
Pt calling with right flank pain.    Pt was seen via virtual visit on 6/9 for s/sx suspicious for pyelo. Dropped off a urine sample at the clinic that day as well. Provider wrote for Cipro 500mg BID x 10 days. Pt says he never got a call from the pharmacy that it was ready to be picked up, but he did not call to check on it either. His sx started to improve on their own, so he assumed the infection was going away. This morning though, he woke up experiencing quite a bit of pain over his right flank area.    He states symptoms of R flank pain and discomfort with urination toward the end of the urine stream. Does not have a fever today.    Called pharmacy where Rx was originally sent and they have it filled and ready to go. Pt notified and will go pick it up and start today. If sx not improved within 48 hours, or sx worsening, advised to call back. Pt verbalized understanding.     Katrin Watson, RN, BSN  [facility]   Triage Nurse Advisor           Reason for Disposition    Health Information question, no triage required and triager able to answer question    Protocols used: INFORMATION ONLY CALL - NO TRIAGE-A-

## 2022-06-20 RX ORDER — DEXTROAMPHETAMINE SACCHARATE, AMPHETAMINE ASPARTATE MONOHYDRATE, DEXTROAMPHETAMINE SULFATE AND AMPHETAMINE SULFATE 5; 5; 5; 5 MG/1; MG/1; MG/1; MG/1
20 CAPSULE, EXTENDED RELEASE ORAL EVERY MORNING
Qty: 30 CAPSULE | Refills: 0 | Status: SHIPPED | OUTPATIENT
Start: 2022-06-20 | End: 2022-08-03

## 2022-06-20 RX ORDER — DEXTROAMPHETAMINE SACCHARATE, AMPHETAMINE ASPARTATE, DEXTROAMPHETAMINE SULFATE AND AMPHETAMINE SULFATE 2.5; 2.5; 2.5; 2.5 MG/1; MG/1; MG/1; MG/1
10 TABLET ORAL DAILY
Qty: 30 TABLET | Refills: 0 | Status: SHIPPED | OUTPATIENT
Start: 2022-06-20 | End: 2022-08-03

## 2022-07-08 ENCOUNTER — TELEPHONE (OUTPATIENT)
Dept: FAMILY MEDICINE | Facility: CLINIC | Age: 42
End: 2022-07-08

## 2022-07-08 NOTE — TELEPHONE ENCOUNTER
.What type of form? dietary forms  What day did you drop off your forms? 7/8/22  Is there a due date? asap (7-10 business day to compete forms)   How would you like to receive these forms? Please mail to [address] [address]  Which clinic was the form dropped off at? [facility]     What is the best number to contact you? Cell [phone]  What time works best to contact you with in 4 hrs? whenever  Is it okay to leave a message? Yes    Galilea Bustos

## 2022-07-13 ENCOUNTER — VIRTUAL VISIT (OUTPATIENT)
Dept: FAMILY MEDICINE | Facility: CLINIC | Age: 42
End: 2022-07-13
Payer: MEDICARE

## 2022-07-13 DIAGNOSIS — K58.2 IRRITABLE BOWEL SYNDROME WITH BOTH CONSTIPATION AND DIARRHEA: Primary | ICD-10-CM

## 2022-07-13 DIAGNOSIS — Z91.018 ALLERGY TO OTHER FOODS: ICD-10-CM

## 2022-07-13 DIAGNOSIS — Z91.018 HISTORY OF ORAL ALLERGY SYNDROME: ICD-10-CM

## 2022-07-13 PROCEDURE — 99442 PR PHYSICIAN TELEPHONE EVALUATION 11-20 MIN: CPT | Mod: 95 | Performed by: PHYSICIAN ASSISTANT

## 2022-07-13 ASSESSMENT — ANXIETY QUESTIONNAIRES
GAD7 TOTAL SCORE: 2
IF YOU CHECKED OFF ANY PROBLEMS ON THIS QUESTIONNAIRE, HOW DIFFICULT HAVE THESE PROBLEMS MADE IT FOR YOU TO DO YOUR WORK, TAKE CARE OF THINGS AT HOME, OR GET ALONG WITH OTHER PEOPLE: SOMEWHAT DIFFICULT
2. NOT BEING ABLE TO STOP OR CONTROL WORRYING: SEVERAL DAYS
5. BEING SO RESTLESS THAT IT IS HARD TO SIT STILL: NOT AT ALL
6. BECOMING EASILY ANNOYED OR IRRITABLE: NOT AT ALL
1. FEELING NERVOUS, ANXIOUS, OR ON EDGE: NOT AT ALL
3. WORRYING TOO MUCH ABOUT DIFFERENT THINGS: NOT AT ALL
GAD7 TOTAL SCORE: 2
7. FEELING AFRAID AS IF SOMETHING AWFUL MIGHT HAPPEN: NOT AT ALL

## 2022-07-13 ASSESSMENT — PATIENT HEALTH QUESTIONNAIRE - PHQ9
5. POOR APPETITE OR OVEREATING: SEVERAL DAYS
SUM OF ALL RESPONSES TO PHQ QUESTIONS 1-9: 3

## 2022-07-13 NOTE — PROGRESS NOTES
Luis Enrique is a 42 year old who is being evaluated via a billable telephone visit.      What phone number would you like to be contacted at? [phone]  How would you like to obtain your AVS? Mail a copy    Assessment & Plan     1. Irritable bowel syndrome with both constipation and diarrhea    2. Allergy To Nuts    3. History of oral allergy syndrome        1-3) we discussed his food allergies and intolerances. His form was reviewed and completed today. Will mail it home.              Return for your annual physical, fasting labs.    Orly Wilkerson PA-C  [facility] BESTROLANDO Dudley is a 42 year old, presenting for the following health issues:  Forms      HPI     Dietary forms   Oral Allergy Syndrome - allergic to raw fruit and veggies  IBS-D - low FODMAP diet  Lactose and gluten intolerant   Eats a lot of potatoes, fish, and meat because his options are limited       Review of Systems   Constitutional, gi and gu systems are negative, except as otherwise noted.      Objective           Vitals:  No vitals were obtained today due to virtual visit.    Physical Exam   healthy, alert and no distress  PSYCH: Alert and oriented times 3; coherent speech, normal   rate and volume, able to articulate logical thoughts, able   to abstract reason, no tangential thoughts, no hallucinations   or delusions  His affect is normal and pleasant  RESP: No cough, no audible wheezing, able to talk in full sentences  Remainder of exam unable to be completed due to telephone visits          Phone call duration: 13 minutes    .  ..

## 2022-07-28 DIAGNOSIS — Z91.030 BEE STING ALLERGY: Primary | ICD-10-CM

## 2022-07-28 DIAGNOSIS — F90.9 ATTENTION DEFICIT HYPERACTIVITY DISORDER (ADHD), UNSPECIFIED ADHD TYPE: ICD-10-CM

## 2022-07-28 RX ORDER — DEXTROAMPHETAMINE SACCHARATE, AMPHETAMINE ASPARTATE MONOHYDRATE, DEXTROAMPHETAMINE SULFATE AND AMPHETAMINE SULFATE 5; 5; 5; 5 MG/1; MG/1; MG/1; MG/1
20 CAPSULE, EXTENDED RELEASE ORAL EVERY MORNING
Qty: 30 CAPSULE | Refills: 0 | Status: CANCELLED | OUTPATIENT
Start: 2022-07-28

## 2022-07-28 RX ORDER — DEXTROAMPHETAMINE SACCHARATE, AMPHETAMINE ASPARTATE, DEXTROAMPHETAMINE SULFATE AND AMPHETAMINE SULFATE 2.5; 2.5; 2.5; 2.5 MG/1; MG/1; MG/1; MG/1
10 TABLET ORAL DAILY
Qty: 30 TABLET | Refills: 0 | Status: CANCELLED | OUTPATIENT
Start: 2022-07-28

## 2022-07-28 NOTE — TELEPHONE ENCOUNTER
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 YES    Call taken on 7/28/2022 at 1:43 PM by Sania Albert

## 2022-07-28 NOTE — TELEPHONE ENCOUNTER
Patient also requesting an Epipen that is not on his medication list.    Thank you,  Yessica Peralta RN

## 2022-07-29 RX ORDER — EPINEPHRINE 0.3 MG/.3ML
0.3 INJECTION SUBCUTANEOUS PRN
Qty: 2 EACH | Refills: 1 | Status: SHIPPED | OUTPATIENT
Start: 2022-07-29

## 2022-07-29 NOTE — TELEPHONE ENCOUNTER
I've never managed his ADHD... he'll need to establish care for this. Needs in person visit if needing to schedule

## 2022-07-29 NOTE — TELEPHONE ENCOUNTER
Called and notified patient of the orders/message below per PCP.    Patient stated understanding and agreeable with the plan of care.     Verónica RN,BSN  Triage Nurse  [facility]: [facility]

## 2022-08-01 ENCOUNTER — TELEPHONE (OUTPATIENT)
Dept: FAMILY MEDICINE | Facility: CLINIC | Age: 42
End: 2022-08-01

## 2022-08-01 DIAGNOSIS — F90.9 ATTENTION DEFICIT HYPERACTIVITY DISORDER (ADHD), UNSPECIFIED ADHD TYPE: ICD-10-CM

## 2022-08-01 NOTE — TELEPHONE ENCOUNTER
Reason for Call:  Medication or medication refill:    Do you use a [facility]?  Name of the pharmacy and phone number for the current request:  [facility]    Name of the medication requested:   amphetamine-dextroamphetamine (ADDERALL XR) 20 MG 24 hr capsule    amphetamine-dextroamphetamine (ADDERALL) 10 MG tablet    Other request: Pt called and would like a refill on these medications. Pt stated that he is completely out of them. Please send RX to pharmacy on file.    Can we leave a detailed message on this number? YES    Phone number patient can be reached at: Home number on file [phone] (home)    Best Time: any    Call taken on 8/1/2022 at 12:55 PM by Lauren Reynolds

## 2022-08-03 RX ORDER — DEXTROAMPHETAMINE SACCHARATE, AMPHETAMINE ASPARTATE, DEXTROAMPHETAMINE SULFATE AND AMPHETAMINE SULFATE 2.5; 2.5; 2.5; 2.5 MG/1; MG/1; MG/1; MG/1
10 TABLET ORAL DAILY
Qty: 30 TABLET | Refills: 0 | Status: SHIPPED | OUTPATIENT
Start: 2022-08-03 | End: 2022-10-20

## 2022-08-03 RX ORDER — DEXTROAMPHETAMINE SACCHARATE, AMPHETAMINE ASPARTATE MONOHYDRATE, DEXTROAMPHETAMINE SULFATE AND AMPHETAMINE SULFATE 5; 5; 5; 5 MG/1; MG/1; MG/1; MG/1
20 CAPSULE, EXTENDED RELEASE ORAL EVERY MORNING
Qty: 30 CAPSULE | Refills: 0 | Status: SHIPPED | OUTPATIENT
Start: 2022-08-03 | End: 2022-10-20

## 2022-08-03 NOTE — TELEPHONE ENCOUNTER
Patient called to check on the status of this refill request. He has been out of medications for a few days now. Please send refill to pharmacy.

## 2022-08-17 ENCOUNTER — VIRTUAL VISIT (OUTPATIENT)
Dept: INTERNAL MEDICINE | Facility: CLINIC | Age: 42
End: 2022-08-17
Payer: MEDICARE

## 2022-08-17 DIAGNOSIS — Z13.220 SCREENING FOR HYPERLIPIDEMIA: ICD-10-CM

## 2022-08-17 DIAGNOSIS — R19.4 CHANGE IN BOWEL HABIT: ICD-10-CM

## 2022-08-17 DIAGNOSIS — H40.003 GLAUCOMA SUSPECT, BILATERAL: ICD-10-CM

## 2022-08-17 DIAGNOSIS — Z11.59 NEED FOR HEPATITIS C SCREENING TEST: ICD-10-CM

## 2022-08-17 DIAGNOSIS — K90.89 OTHER INTESTINAL MALABSORPTION: ICD-10-CM

## 2022-08-17 DIAGNOSIS — K52.3 INDETERMINATE COLITIS: Primary | ICD-10-CM

## 2022-08-17 DIAGNOSIS — F43.10 PTSD (POST-TRAUMATIC STRESS DISORDER): ICD-10-CM

## 2022-08-17 DIAGNOSIS — K58.0 IRRITABLE BOWEL SYNDROME WITH DIARRHEA: ICD-10-CM

## 2022-08-17 DIAGNOSIS — Z00.00 PREVENTATIVE HEALTH CARE: ICD-10-CM

## 2022-08-17 PROBLEM — M51.26 LUMBAR DISC HERNIATION: Status: RESOLVED | Noted: 2020-01-16 | Resolved: 2022-08-17

## 2022-08-17 PROCEDURE — 99443 PR PHYSICIAN TELEPHONE EVALUATION 21-30 MIN: CPT | Mod: 95 | Performed by: INTERNAL MEDICINE

## 2022-08-17 RX ORDER — ESOMEPRAZOLE MAGNESIUM 40 MG/1
40 CAPSULE, DELAYED RELEASE ORAL AT BEDTIME
Qty: 30 CAPSULE | Refills: 11 | Status: SHIPPED | OUTPATIENT
Start: 2022-08-17 | End: 2023-08-17

## 2022-08-17 RX ORDER — METOCLOPRAMIDE 10 MG/1
10 TABLET ORAL AT BEDTIME
Qty: 30 TABLET | Refills: 11 | Status: SHIPPED | OUTPATIENT
Start: 2022-08-17 | End: 2023-08-17

## 2022-08-17 NOTE — PATIENT INSTRUCTIONS
Recertify for medical cannabis    Trial of Nexium at night for morning nausea    Trial of metoclopramide at night for morning nausea    Routine health maintenance labs and shots    Labs for malabsorption, inflammatory bowel disease, celiac disease    Consider trial of sulfasalazine

## 2022-08-17 NOTE — PROGRESS NOTES
"Luis Enrique is a 42 year old who is being evaluated via a billable telephone visit.      What phone number would you like to be contacted at? [phone]  How would you like to obtain your AVS? Mail a copy    {PROVIDER CHARTING PREFERENCE:303667}    Subjective   Luis Enrique is a 42 year old accompanied by his alone, presenting for the following health issues:  recerication for medical Marijuana (r) and Recheck Medication      HPI     {SUPERLIST (Optional):633308}  {additonal problems for provider to add (Optional):840798}    Review of Systems   {ROS COMP (Optional):871319}      Objective           Vitals:  No vitals were obtained today due to virtual visit.    Physical Exam   {GENERAL APPEARANCE:50::\"healthy\",\"alert\",\"no distress\"}  PSYCH: Alert and oriented times 3; coherent speech, normal   rate and volume, able to articulate logical thoughts, able   to abstract reason, no tangential thoughts, no hallucinations   or delusions  His affect is { :6217290::\"normal\"}  RESP: No cough, no audible wheezing, able to talk in full sentences  Remainder of exam unable to be completed due to telephone visits    {Diagnostic Test Results (Optional):877802}    {AMBULATORY ATTESTATION (Optional):942855}        Phone call duration: *** minutes    .  ..  "

## 2022-08-17 NOTE — PROGRESS NOTES
Luis Enrique is a 42 year old male being evaluated via a billable phone visit, and would like to be contacted via the following  Home number on file [phone] (home)    ASSESSMENT and PLAN:  1. Indeterminate colitis   Consider irritable bowel versus occult inflammatory bowel disease versus malabsorptive.  Consider trial of sulfasalazine.  Recommend updating labs.  Trial of antacid and metoclopramide  - Ferritin; Future  - metoclopramide (REGLAN) 10 MG tablet; Take 1 tablet (10 mg) by mouth At Bedtime  Dispense: 30 tablet; Refill: 11    2. PTSD (post-traumatic stress disorder)  Recertify for medical cannabis  - medical cannabis (Patient's own supply); (The purpose of this order is to document that the patient reports taking medical cannabis.  This is not a prescription, and is not used to certify that the patient has a qualifying medical condition.)  Dispense: 0 Information only; Refill: 0    3. Glaucoma suspect, bilateral  Recertify for medical cannabis  - medical cannabis (Patient's own supply); (The purpose of this order is to document that the patient reports taking medical cannabis.  This is not a prescription, and is not used to certify that the patient has a qualifying medical condition.)  Dispense: 0 Information only; Refill: 0    4. Need for hepatitis C screening test  - Hepatitis C Screen Reflex to HCV RNA Quant and Genotype; Future    5. Screening for hyperlipidemia  - Lipid panel reflex to direct LDL Non-fasting; Future    6. Irritable bowel syndrome with diarrhea  Recertify for medical cannabis.  Trial again of Nexium.  Check for malabsorption  - medical cannabis (Patient's own supply); (The purpose of this order is to document that the patient reports taking medical cannabis.  This is not a prescription, and is not used to certify that the patient has a qualifying medical condition.)  Dispense: 0 Information only; Refill: 0  - esomeprazole (NEXIUM) 40 MG DR capsule; Take 1 capsule (40 mg) by mouth At  Bedtime  Dispense: 30 capsule; Refill: 11  - Tissue transglutaminase doug IgA and IgG; Future  - Crohns prognostic; Future  - TSH; Future  - Vitamin B12; Future  - Vitamin D Deficiency; Future  - CBC with Platelets & Differential; Future  - Ferritin; Future  - CRP inflammation; Future  - Erythrocyte sedimentation rate auto; Future  - Comprehensive metabolic panel; Future    7. Preventative health care  - TDAP VACCINE (Adacel, Boostrix); Future    8. Change in bowel habit   - TSH; Future    9. Other intestinal malabsorption   - Vitamin D Deficiency; Future     Preventive Care Assessed: As above    Patient Instructions   Recertify for medical cannabis    Trial of Nexium at night for morning nausea    Trial of metoclopramide at night for morning nausea    Routine health maintenance labs and shots    Labs for malabsorption, inflammatory bowel disease, celiac disease    Consider trial of sulfasalazine            Return in about 6 months (around 2/17/2023) for using a video visit.       CHIEF COMPLAINT:  Chief Complaint   Patient presents with     Recheck Medication     And recertification for medical cannabis       HISTORY OF PRESENT ILLNESS:  Luis Enrique is a 42 year old male contacting the clinic today via phone for recertification for cannabis.  He has previously been certified by Dr. Hua Salazar.  He finds this helpful for his glaucoma and irritable bowel.  He has irritable bowel predominant diarrhea.  He is very nauseated each morning.  He has been off of his cannabis for several months.  He has had acid reflux treated with Nexium in the past.  Cannabis helps glaucoma, PTSD and his nausea    He has frequent diarrhea.  He has undergone evaluation the last in 2013.  We discussed occult progressing gluten, or inflammatory bowel disease.  We discussed a trial of sulfasalazine    Routine health maintenance reviewed    REVIEW OF SYSTEMS:  Intentional weight loss    PFSH:  Social History     Social History Narrative     Not  "on file       TOBACCO USE:  History   Smoking Status     Never Smoker   Smokeless Tobacco     Never Used       VITALS:  There were no vitals filed for this visit.  There were no vitals taken for this visit. Estimated body mass index is 24 kg/m  as calculated from the following:    Height as of 2/18/15: 1.77 m (5' 9.7\").    Weight as of 12/7/21: 75.2 kg (165 lb 12.8 oz).    PHYSICAL EXAM:  (observations via Phone)  Alert and oriented    MEDICATIONS  Current Outpatient Medications   Medication Sig Dispense Refill     amphetamine-dextroamphetamine (ADDERALL XR) 20 MG 24 hr capsule Take 1 capsule (20 mg) by mouth every morning 30 capsule 0     amphetamine-dextroamphetamine (ADDERALL) 10 MG tablet Take 1 tablet (10 mg) by mouth daily 30 tablet 0     Ascorbic Acid (VITAMIN C PO)        Cholecalciferol (VITAMIN D3 PO) Take by mouth daily       EPINEPHrine (ANY BX GENERIC EQUIV) 0.3 MG/0.3ML injection 2-pack Inject 0.3 mLs (0.3 mg) into the muscle as needed for anaphylaxis May repeat one time in 5-15 minutes if response to initial dose is inadequate. 2 each 1     esomeprazole (NEXIUM) 40 MG DR capsule Take 1 capsule (40 mg) by mouth At Bedtime 30 capsule 11     loratadine-pseudoephedrine (CLARITIN-D 24-HOUR)  mg per 24 hr tablet [LORATADINE-PSEUDOEPHEDRINE (CLARITIN-D 24-HOUR)  MG PER 24 HR TABLET] Take 1 tablet by mouth daily. As directed.       medical cannabis (Patient's own supply) (The purpose of this order is to document that the patient reports taking medical cannabis.  This is not a prescription, and is not used to certify that the patient has a qualifying medical condition.) 0 Information only 0     metoclopramide (REGLAN) 10 MG tablet Take 1 tablet (10 mg) by mouth At Bedtime 30 tablet 11       Notes summarized:   Labs, x-rays, cardiology, GI tests reviewed: Ordered as below  No results for input(s): HGB, WBC, NA, POTASSIUM, CR, A1C, PSA, URIC, B12, TSH, VITDT, SED, CRP in the last 96004 hours.  No " results found for: [id number]  Lab Results   Component Value Date    CHOL 164 05/06/2013     New orders:   Orders Placed This Encounter   Procedures     TDAP VACCINE (Adacel, Boostrix)     Hepatitis C Screen Reflex to HCV RNA Quant and Genotype     Lipid panel reflex to direct LDL Non-fasting     Tissue transglutaminase doug IgA and IgG     Crohns prognostic     TSH     Vitamin B12     Vitamin D Deficiency     Ferritin     CRP inflammation     Erythrocyte sedimentation rate auto     Comprehensive metabolic panel     CBC with Platelets & Differential       Independent review of:  Supplemental history by:      Patient [facility]    [facility]    Royce Muniz MD  [facility]    Phone Start Time: 10:23 AM  Phone End time:  10:47 AM while he calls cannabis program for his number equals 24 minutes  1124 until 11:37 AM second phone call equals 13 minutes  Conversation plus orders: 37 minutes  Dictation time:  3 minutes    The visit lasted a total of 40 minutes

## 2022-08-19 DIAGNOSIS — K58.0 IRRITABLE BOWEL SYNDROME WITH DIARRHEA: ICD-10-CM

## 2022-08-19 RX ORDER — ESOMEPRAZOLE MAGNESIUM 40 MG/1
40 CAPSULE, DELAYED RELEASE ORAL AT BEDTIME
Qty: 90 CAPSULE | Refills: 11 | OUTPATIENT
Start: 2022-08-19

## 2022-09-14 NOTE — TELEPHONE ENCOUNTER
Reason for Call:  Medication or medication refill:    Do you use a [facility]?  Name of the pharmacy and phone number for the current request:  [facility]    Name of the medication requested: amphetamine-dextroamphetamine (ADDERALL XR) 20 MG 24 hr capsule, amphetamine-dextroamphetamine (ADDERALL) 10 MG tablet    Other request: Patient called to request a refill of these two medication. He forgot and is now out of medications. Please send refills to pharmacy.    Can we leave a detailed message on this number? YES    Phone number patient can be reached at: Home number on file [phone] (home)    Best Time: any    Call taken on 3/16/2022 at 2:22 PM by Shaina Scott      
166

## 2022-09-25 ENCOUNTER — HEALTH MAINTENANCE LETTER (OUTPATIENT)
Age: 42
End: 2022-09-25

## 2022-10-19 DIAGNOSIS — F90.9 ATTENTION DEFICIT HYPERACTIVITY DISORDER (ADHD), UNSPECIFIED ADHD TYPE: ICD-10-CM

## 2022-10-19 NOTE — TELEPHONE ENCOUNTER
Medication Question or Refill    Contacts       Type Contact Phone/Fax    10/19/2022 01:06 PM CDT Phone (Incoming) Luis Enrique Rosario (Self) [phone] (M)          What medication are you calling about (include dose and sig)?:     amphetamine-dextroamphetamine (ADDERALL) 10 MG tablet  amphetamine-dextroamphetamine (ADDERALL XR) 20 MG 24 hr capsule    Controlled Substance Agreement on file:   CSA -- Patient Level:     [Media Unavailable] Controlled Substance Agreement - Non - Opioid - Scan on 1/28/2020: NON-OPIOID CONTROLLED SUBSTANCE AGREEMENT   [Media Unavailable] Controlled Substance Agreement - Non - Opioid - Scan on 4/5/2019       Who prescribed the medication?: Dr. Mata    Do you need a refill? Yes:     Patient called to request a refill of these two medications. Please send refill to pharmacy.     When did you use the medication last? n/a    Patient offered an appointment? No    Do you have any questions or concerns?  No    Preferred Pharmacy:   [id number] - [place], [place] - [address]  AT [facility] [place]  [address]   [place] [address]  Phone: [phone] Fax: [phone]      Could we send this information to you in [facility] or would you prefer to receive a phone call?:   Patient would prefer a phone call   Okay to leave a detailed message?: Yes at Home number on file [phone] (home)

## 2022-10-20 RX ORDER — DEXTROAMPHETAMINE SACCHARATE, AMPHETAMINE ASPARTATE, DEXTROAMPHETAMINE SULFATE AND AMPHETAMINE SULFATE 2.5; 2.5; 2.5; 2.5 MG/1; MG/1; MG/1; MG/1
10 TABLET ORAL DAILY
Qty: 30 TABLET | Refills: 0 | Status: SHIPPED | OUTPATIENT
Start: 2022-10-20 | End: 2023-01-12

## 2022-10-20 RX ORDER — DEXTROAMPHETAMINE SACCHARATE, AMPHETAMINE ASPARTATE MONOHYDRATE, DEXTROAMPHETAMINE SULFATE AND AMPHETAMINE SULFATE 5; 5; 5; 5 MG/1; MG/1; MG/1; MG/1
20 CAPSULE, EXTENDED RELEASE ORAL EVERY MORNING
Qty: 30 CAPSULE | Refills: 0 | Status: SHIPPED | OUTPATIENT
Start: 2022-10-20 | End: 2023-01-12

## 2023-01-09 ENCOUNTER — TELEPHONE (OUTPATIENT)
Dept: FAMILY MEDICINE | Facility: CLINIC | Age: 43
End: 2023-01-09
Payer: MEDICARE

## 2023-01-09 DIAGNOSIS — F90.9 ATTENTION DEFICIT HYPERACTIVITY DISORDER (ADHD), UNSPECIFIED ADHD TYPE: ICD-10-CM

## 2023-01-09 NOTE — TELEPHONE ENCOUNTER
Medication Question or Refill        What medication are you calling about (include dose and sig)?:   amphetamine-dextroamphetamine (ADDERALL XR) 20 MG 24 hr capsule  amphetamine-dextroamphetamine (ADDERALL) 10 MG tablet    Controlled Substance Agreement on file:   CSA -- Patient Level:     [Media Unavailable] Controlled Substance Agreement - Non - Opioid - Scan on 1/28/2020: NON-OPIOID CONTROLLED SUBSTANCE AGREEMENT   [Media Unavailable] Controlled Substance Agreement - Non - Opioid - Scan on 4/5/2019       Who prescribed the medication?: Dr. Mata    Do you need a refill? Yes:     When did you use the medication last? NA    Patient offered an appointment? Yes: 1/11/2023    Do you have any questions or concerns?  No    Preferred Pharmacy:   [id number] - [place], [place] - [address] [facility] [place]  [address]   [place] [address]  Phone: [phone] Fax: [phone]      Could we send this information to you in [facility] or would you prefer to receive a phone call?:   Patient would prefer a phone call   Okay to leave a detailed message?: Yes at Home number on file [phone] (home)

## 2023-01-12 RX ORDER — DEXTROAMPHETAMINE SACCHARATE, AMPHETAMINE ASPARTATE MONOHYDRATE, DEXTROAMPHETAMINE SULFATE AND AMPHETAMINE SULFATE 5; 5; 5; 5 MG/1; MG/1; MG/1; MG/1
20 CAPSULE, EXTENDED RELEASE ORAL EVERY MORNING
Qty: 30 CAPSULE | Refills: 0 | Status: SHIPPED | OUTPATIENT
Start: 2023-01-12 | End: 2023-02-03

## 2023-01-12 RX ORDER — DEXTROAMPHETAMINE SACCHARATE, AMPHETAMINE ASPARTATE, DEXTROAMPHETAMINE SULFATE AND AMPHETAMINE SULFATE 2.5; 2.5; 2.5; 2.5 MG/1; MG/1; MG/1; MG/1
10 TABLET ORAL DAILY
Qty: 30 TABLET | Refills: 0 | Status: SHIPPED | OUTPATIENT
Start: 2023-01-12 | End: 2023-02-03

## 2023-02-01 NOTE — TELEPHONE ENCOUNTER
Pt called and would like to send this RX to another [facility] [address], [place] [address] phone number is [phone]. Pt no longer lives near his old [facility]. Please send in a new RX to the preferred [facility] on [place]. Thanks!

## 2023-02-03 RX ORDER — DEXTROAMPHETAMINE SACCHARATE, AMPHETAMINE ASPARTATE MONOHYDRATE, DEXTROAMPHETAMINE SULFATE AND AMPHETAMINE SULFATE 5; 5; 5; 5 MG/1; MG/1; MG/1; MG/1
20 CAPSULE, EXTENDED RELEASE ORAL EVERY MORNING
Qty: 30 CAPSULE | Refills: 0 | Status: CANCELLED | OUTPATIENT
Start: 2023-02-03

## 2023-02-03 RX ORDER — DEXTROAMPHETAMINE SACCHARATE, AMPHETAMINE ASPARTATE, DEXTROAMPHETAMINE SULFATE AND AMPHETAMINE SULFATE 2.5; 2.5; 2.5; 2.5 MG/1; MG/1; MG/1; MG/1
10 TABLET ORAL DAILY
Qty: 30 TABLET | Refills: 0 | Status: CANCELLED | OUTPATIENT
Start: 2023-02-03

## 2023-02-03 RX ORDER — DEXTROAMPHETAMINE SACCHARATE, AMPHETAMINE ASPARTATE, DEXTROAMPHETAMINE SULFATE AND AMPHETAMINE SULFATE 2.5; 2.5; 2.5; 2.5 MG/1; MG/1; MG/1; MG/1
10 TABLET ORAL DAILY
Qty: 30 TABLET | Refills: 0 | Status: SHIPPED | OUTPATIENT
Start: 2023-02-03 | End: 2023-03-20

## 2023-02-03 RX ORDER — DEXTROAMPHETAMINE SACCHARATE, AMPHETAMINE ASPARTATE MONOHYDRATE, DEXTROAMPHETAMINE SULFATE AND AMPHETAMINE SULFATE 5; 5; 5; 5 MG/1; MG/1; MG/1; MG/1
20 CAPSULE, EXTENDED RELEASE ORAL EVERY MORNING
Qty: 30 CAPSULE | Refills: 0 | Status: SHIPPED | OUTPATIENT
Start: 2023-02-03 | End: 2023-03-20

## 2023-02-13 ENCOUNTER — VIRTUAL VISIT (OUTPATIENT)
Dept: FAMILY MEDICINE | Facility: CLINIC | Age: 43
End: 2023-02-13
Payer: MEDICARE

## 2023-02-13 DIAGNOSIS — R10.0 ACUTE ABDOMEN: ICD-10-CM

## 2023-02-13 DIAGNOSIS — R19.7 DIARRHEA, UNSPECIFIED TYPE: Primary | ICD-10-CM

## 2023-02-13 DIAGNOSIS — K58.0 IRRITABLE BOWEL SYNDROME WITH DIARRHEA: ICD-10-CM

## 2023-02-13 PROCEDURE — 99442 PR PHYSICIAN TELEPHONE EVALUATION 11-20 MIN: CPT | Mod: 95 | Performed by: PHYSICIAN ASSISTANT

## 2023-02-13 ASSESSMENT — PATIENT HEALTH QUESTIONNAIRE - PHQ9
10. IF YOU CHECKED OFF ANY PROBLEMS, HOW DIFFICULT HAVE THESE PROBLEMS MADE IT FOR YOU TO DO YOUR WORK, TAKE CARE OF THINGS AT HOME, OR GET ALONG WITH OTHER PEOPLE: SOMEWHAT DIFFICULT
SUM OF ALL RESPONSES TO PHQ QUESTIONS 1-9: 2
SUM OF ALL RESPONSES TO PHQ QUESTIONS 1-9: 2

## 2023-02-13 NOTE — PROGRESS NOTES
Luis Enrique is a 42 year old who is being evaluated via a billable telephone visit.      What phone number would you like to be contacted at? [phone]  How would you like to obtain your AVS? Lukas    Distant Location (provider location):  Off-site    Assessment & Plan     Diarrhea, unspecified type  Irritable bowel syndrome with diarrhea  Acute abdomen  Unclear etiology - IBS vs infectious diarrhea vs other - patient to push fluids and electrolytes and BRAT diet; option for lab onlyappt with stool cultures placed today; referral for Gastroenterology placed today as well for potential next steps. May need IVF and further work up through [facility] in the next 24-48 hours though. Return to clinic with any worsening or changes in symptoms and follow up with PCP for routine care.   - Ova and Parasite Exam Routine; Future  - Enteric Bacteria and Virus Panel by TERE Stool; Future  - Cryptosporidium/Giardia Immunoassay; Future  - Adult GI  Referral - Consult Only; Future    Review of prior external note(s) from - previous routine notes  20 minutes spent on the date of the encounter doing chart review, history and exam, documentation and further activities per the note       There are no Patient Instructions on file for this visit.    Return in about 4 weeks (around 3/13/2023) for Follow up, with PCP, or sooner with worsening symptoms.    Ariela Neil PA-C  [facility]   Luis Enrique is a 42 year old presenting for the following health issues:  Gastrointestinal Problem      HPI     Concern - GI Symptoms   Onset: 3-7 days ago   Description: Fatigue, Cramping and Diarrhea (high frequency), Nausea and Vomiting   Patient has been off Adderall for a month, has concerns with Adderall ingredients that may contribute to symptoms  - Also stopped taking a Zinc and Magnesium Supplement   - Started taking Adderall again about 1-2 weeks ago    Intensity: Mild   Progression of Symptoms:   Worsening and constant with fatigue  Accompanying Signs & Symptoms: None   Previous history of similar problem: None   Precipitating factors:        Worsened by: None   Alleviating factors:        Improved by: None   Therapies tried and outcome: Pepto Bismol - helps a little    Patient wonders if he needs some electrolytes.  No fever, chills, night sweats   History of IBS though with constipation and sometimes similar symptoms to above.    Review of Systems   Constitutional, HEENT, cardiovascular, pulmonary, GI, , musculoskeletal, neuro, skin, endocrine and psych systems are negative, except as otherwise noted.      Objective           Vitals:  No vitals were obtained today due to virtual visit.    Physical Exam   healthy, alert and no distress  PSYCH: Alert and oriented times 3; coherent speech, normal   rate and volume, able to articulate logical thoughts, able   to abstract reason, no tangential thoughts, no hallucinations   or delusions  His affect is normal  RESP: No cough, no audible wheezing, able to talk in full sentences  Remainder of exam unable to be completed due to telephone visits            Phone call duration: 20 minutes    Answers for HPI/ROS submitted by the patient on 2/13/2023  If you checked off any problems, how difficult have these problems made it for you to do your work, take care of things at home, or get along with other people?: Somewhat difficult  PHQ9 TOTAL SCORE: 2

## 2023-03-06 NOTE — LETTER
7/22/2021     Luis Enrique Rosario   1980  [address] [place] [address] [address]  [facility] [address]       To Whom It May Concern:    Luis Enrique Abraham missed work on July 14, 2021, as symptoms were not resolved at that time.     If you have further questions, please do not hesitate to contact me.     Sincerely,        PERI COWAN MD                    [facility] Delvalle

## 2023-05-26 ENCOUNTER — TELEPHONE (OUTPATIENT)
Dept: NURSING | Facility: CLINIC | Age: 43
End: 2023-05-26
Payer: MEDICARE

## 2023-05-26 DIAGNOSIS — F90.9 ATTENTION DEFICIT HYPERACTIVITY DISORDER (ADHD), UNSPECIFIED ADHD TYPE: ICD-10-CM

## 2023-05-27 ENCOUNTER — NURSE TRIAGE (OUTPATIENT)
Dept: NURSING | Facility: CLINIC | Age: 43
End: 2023-05-27
Payer: MEDICARE

## 2023-05-27 NOTE — TELEPHONE ENCOUNTER
Patient calling for refill of Adderall XR and regular from Dr. Mata. [facility] in [place].  Routing to Dr. Esperanza Lynn RN   05/26/23 7:21 PM  [facility] Nurse Advisor

## 2023-05-27 NOTE — TELEPHONE ENCOUNTER
Needs refill on prescriptions. Adderall XR 20 mg ER & 10 mg in afternoon.  [facility] in [place]. I explained, it's a controlled substance so has to wait until Tuesday, when clinic is open again. He understands.  Gina Montanez RN  [facility]

## 2023-05-27 NOTE — TELEPHONE ENCOUNTER
Needs refill on prescriptions. Adderall XR 20 mg ER & 10 mg in afternoon.  [facility] in [place]. I explained, it's a controlled substance so has to wait until Tuesday, when clinic is open again. He understands. I added this message to the existing refill request.  Gina Montanez RN  [facility]    Reason for Disposition    Caller requesting a CONTROLLED substance prescription refill (e.g., narcotics, ADHD medicines)    Additional Information    Negative: New-onset or worsening symptoms, see that guideline (e.g., diarrhea, runny nose, sore throat)    Negative: Medicine question not related to refill or renewal    Negative: Caller (e.g., patient or pharmacist) requesting information about a new medicine    Negative: Caller requesting information unrelated to medicine    Negative: [1] Prescription refill request for ESSENTIAL medicine (i.e., likelihood of harm to patient if not taken) AND [2] triager unable to refill per department policy    Negative: [1] Prescription not at pharmacy AND [2] was prescribed by PCP recently  (Exception: triager has access to EMR and prescription is recorded there. Go to Home Care and confirm for pharmacy.)    Negative: [1] Pharmacy calling with prescription questions AND [2] triager unable to answer question    Negative: Prescription request for new medicine (not a refill)    Protocols used: MEDICATION REFILL AND RENEWAL CALL-A-

## 2023-05-30 RX ORDER — DEXTROAMPHETAMINE SACCHARATE, AMPHETAMINE ASPARTATE, DEXTROAMPHETAMINE SULFATE AND AMPHETAMINE SULFATE 2.5; 2.5; 2.5; 2.5 MG/1; MG/1; MG/1; MG/1
10 TABLET ORAL DAILY
Qty: 30 TABLET | Refills: 0 | Status: SHIPPED | OUTPATIENT
Start: 2023-05-30

## 2023-05-30 RX ORDER — DEXTROAMPHETAMINE SACCHARATE, AMPHETAMINE ASPARTATE MONOHYDRATE, DEXTROAMPHETAMINE SULFATE AND AMPHETAMINE SULFATE 5; 5; 5; 5 MG/1; MG/1; MG/1; MG/1
20 CAPSULE, EXTENDED RELEASE ORAL EVERY MORNING
Qty: 30 CAPSULE | Refills: 0 | Status: SHIPPED | OUTPATIENT
Start: 2023-05-30

## 2023-07-31 ENCOUNTER — TELEPHONE (OUTPATIENT)
Dept: FAMILY MEDICINE | Facility: CLINIC | Age: 43
End: 2023-07-31
Payer: MEDICARE

## 2023-07-31 NOTE — TELEPHONE ENCOUNTER
Called patient and relayed message from Dr. Mata. Provided patient with Entire scheduling phone number.

## 2023-07-31 NOTE — TELEPHONE ENCOUNTER
General Call      Reason for Call:  Marijuana question    What are your questions or concerns:  Pt called as he is not sure who to reach out to. He is a pt to Dr. Salazar but he is no longer here. Pt has also seen Dr. Mata too so that's why he is calling to the clinic. Pt renews his marijuana program every year with his PCP but since Dr. Salazar is no longer here he was hoping that Esperanza Mercer could possibly help him sign the paper or help guide him to who he can get it sign. Please call and advise, thanks!    Date of last appointment with provider: 2/17/2023    Could we send this information to you in [email or web address] or would you prefer to receive a phone call?:   Patient would prefer a phone call   Okay to leave a detailed message?: Yes at Home number on file [phone] (home)

## 2024-07-20 ENCOUNTER — HEALTH MAINTENANCE LETTER (OUTPATIENT)
Age: 44
End: 2024-07-20

## 2025-08-09 ENCOUNTER — HEALTH MAINTENANCE LETTER (OUTPATIENT)
Age: 45
End: 2025-08-09